# Patient Record
Sex: FEMALE | Race: WHITE | NOT HISPANIC OR LATINO | Employment: FULL TIME | URBAN - METROPOLITAN AREA
[De-identification: names, ages, dates, MRNs, and addresses within clinical notes are randomized per-mention and may not be internally consistent; named-entity substitution may affect disease eponyms.]

---

## 2017-01-12 ENCOUNTER — TELEPHONE (OUTPATIENT)
Dept: PALLIATIVE MEDICINE | Facility: CLINIC | Age: 52
End: 2017-01-12

## 2017-01-12 ENCOUNTER — OFFICE VISIT (OUTPATIENT)
Dept: PALLIATIVE MEDICINE | Facility: CLINIC | Age: 52
End: 2017-01-12
Payer: COMMERCIAL

## 2017-01-12 VITALS
SYSTOLIC BLOOD PRESSURE: 102 MMHG | HEART RATE: 77 BPM | DIASTOLIC BLOOD PRESSURE: 62 MMHG | WEIGHT: 148 LBS | OXYGEN SATURATION: 98 % | BODY MASS INDEX: 28.9 KG/M2

## 2017-01-12 DIAGNOSIS — B02.29 POST HERPETIC NEURALGIA: Primary | ICD-10-CM

## 2017-01-12 PROCEDURE — 99243 OFF/OP CNSLTJ NEW/EST LOW 30: CPT | Performed by: ANESTHESIOLOGY

## 2017-01-12 RX ORDER — LIDOCAINE 50 MG/G
1 PATCH TOPICAL EVERY 24 HOURS
Qty: 30 PATCH | Refills: 1 | Status: SHIPPED | OUTPATIENT
Start: 2017-01-12 | End: 2022-02-23

## 2017-01-12 ASSESSMENT — PAIN SCALES - GENERAL: PAINLEVEL: MILD PAIN (3)

## 2017-01-12 NOTE — PATIENT INSTRUCTIONS
We will call you to schedule Qutenza patch - capsaisin treatment  Prescriptions sent to your pharmacy    Maria Alejandra Card MD     Nurse Triage line:  [phone]   Call this number with any questions or concerns. You may leave a detailed message anytime. Calls are typically returned Monday through Friday between 8 AM and 4:30 PM. We usually get back to you within 2 business days depending on the issue/request.       Medication refills:    For non-narcotic medications, call your pharmacy directly to request a refill. The pharmacy will contact the [facility] for authorization. Please allow 3-4 days for these refills to be processed.     For narcotic refills, call the nurse triage line or send a TiVo message. Please contact us 7-10 days before your refill is due. The message MUST include the name of the specific medication(s) requested and how you would like to receive the prescription(s). The options are as follows:    Pain Clinic staff can mail the prescription to your pharmacy. Please tell us the name of the pharmacy.    You may pick the prescription up at the Pain Clinic (tell us the location) or during a clinic visit with your pain provider    Pain Clinic staff can deliver the prescription to the [facility] pharmacy in the clinic building. Please tell us the location.      Scheduling number: [phone].  Call this number to schedule or change appointments.    We believe regular attendance is key to your success in our program.    Any time you are unable to keep your appointment we ask that you call us at least 24 hours in advance to let us know. This will allow us to offer the appointment time to another patient.

## 2017-01-12 NOTE — MR AVS SNAPSHOT
After Visit Summary   1/12/2017    Tomasa Darling    MRN: [id number]           Patient Information     Date Of Birth          1965        Visit Information        Provider Department      1/12/2017 11:00 AM Maria Alejandra Card MD [facility]        Today's Diagnoses     Post herpetic neuralgia    -  1       Care Instructions    We will call you to schedule Qutenza patch - capsaisin treatment  Prescriptions sent to your pharmacy    Maria Alejandra Card MD     Nurse Triage line:  [phone]   Call this number with any questions or concerns. You may leave a detailed message anytime. Calls are typically returned Monday through Friday between 8 AM and 4:30 PM. We usually get back to you within 2 business days depending on the issue/request.       Medication refills:    For non-narcotic medications, call your pharmacy directly to request a refill. The pharmacy will contact the [facility] for authorization. Please allow 3-4 days for these refills to be processed.     For narcotic refills, call the nurse triage line or send a [employer] message. Please contact us 7-10 days before your refill is due. The message MUST include the name of the specific medication(s) requested and how you would like to receive the prescription(s). The options are as follows:    Pain Clinic staff can mail the prescription to your pharmacy. Please tell us the name of the pharmacy.    You may pick the prescription up at the Pain Clinic (tell us the location) or during a clinic visit with your pain provider    Pain Clinic staff can deliver the prescription to the [facility] pharmacy in the clinic building. Please tell us the location.      Scheduling number: [phone].  Call this number to schedule or change appointments.    We believe regular attendance is key to your success in our program.    Any time you are unable to keep your appointment we ask that you call us at least 24 hours in advance to let us know.  This will allow us to offer the appointment time to another patient.             Follow-ups after your visit        Who to contact     If you have questions or need follow up information about today's clinic visit or your schedule please contact [facility] directly at [phone].  Normal or non-critical lab and imaging results will be communicated to you by [employer], letter or phone within 4 business days after the clinic has received the results. If you do not hear from us within 7 days, please contact the clinic through [employer]ishart or phone. If you have a critical or abnormal lab result, we will notify you by phone as soon as possible.  Submit refill requests through [employer] or call your pharmacy and they will forward the refill request to us. Please allow 3 business days for your refill to be completed.          Additional Information About Your Visit        [employer] Information     [employer] gives you secure access to your electronic health record. If you see a primary care provider, you can also send messages to your care team and make appointments. If you have questions, please call your primary care clinic.  If you do not have a primary care provider, please call [phone] and they will assist you.        Care EveryWhere ID     This is your Care EveryWhere ID. This could be used by other organizations to access your [place] medical records  [id number]        Your Vitals Were     Pulse Pulse Oximetry Last Period Breastfeeding?          77 98% 07/31/2012 No         Blood Pressure from Last 3 Encounters:   01/12/17 102/62   11/25/16 114/72   02/23/16 112/66    Weight from Last 3 Encounters:   01/12/17 67.132 kg (148 lb)   11/25/16 67.495 kg (148 lb 12.8 oz)   02/23/16 69.854 kg (154 lb)              Today, you had the following     No orders found for display         Today's Medication Changes          These changes are accurate as of: 1/12/17 11:55 AM.  If you have any questions, ask your nurse  or doctor.               Start taking these medicines.        Dose/Directions    amitriptyline 25 MG tablet   Commonly known as:  ELAVIL   Used for:  Post herpetic neuralgia        Dose:  25 mg   Take 1 tablet (25 mg) by mouth At Bedtime   Quantity:  90 tablet   Refills:  1       diclofenac sodium 3 % Gel   Used for:  Post herpetic neuralgia        Apply to affected area up to 2 times/day   Quantity:  1 Tube   Refills:  1       lidocaine 2 % topical gel   Commonly known as:  XYLOCAINE   Used for:  Post herpetic neuralgia        Apply topically as needed for moderate pain   Quantity:  30 mL   Refills:  1       lidocaine 5 % Patch   Commonly known as:  LIDODERM   Used for:  Post herpetic neuralgia        Dose:  1 patch   Place 1 patch onto the skin every 24 hours   Quantity:  30 patch   Refills:  1            Where to get your medicines      These medications were sent to [facility] [address] [facility] - [place], [place] - [address]  [facility]  [address]  [facility] [address]     Phone:  [phone]    - amitriptyline 25 MG tablet  - diclofenac sodium 3 % Gel  - lidocaine 2 % topical gel  - lidocaine 5 % Patch             Primary Care Provider Office Phone # Fax #    Krishnakumari G MD Peter [phone]       [facility] [address] [place] [address]        Thank you!     Thank you for choosing [facility]  for your care. Our goal is always to provide you with excellent care. Hearing back from our patients is one way we can continue to improve our services. Please take a few minutes to complete the written survey that you may receive in the mail after your visit with us. Thank you!             Your Updated Medication List - Protect others around you: Learn how to safely use, store and throw away your medicines at www.disposemymeds.org.          This list is accurate as of: 1/12/17 11:55 AM.  Always use your most recent med list.                   Brand Name  Dispense Instructions for use    amitriptyline 25 MG tablet    ELAVIL    90 tablet    Take 1 tablet (25 mg) by mouth At Bedtime       diclofenac sodium 3 % Gel     1 Tube    Apply to affected area up to 2 times/day       ibuprofen 200 MG tablet    ADVIL/MOTRIN    30 tablet    Take 3 tablets (600 mg) by mouth every 8 hours as needed for mild pain       lidocaine 2 % topical gel    XYLOCAINE    30 mL    Apply topically as needed for moderate pain       lidocaine 5 % Patch    LIDODERM    30 patch    Place 1 patch onto the skin every 24 hours       NASONEX 50 MCG/ACT spray   Generic drug:  mometasone     1 Month    INHALE 2 SPRAYS IN EACH NOSTRIL ONE DAILY

## 2017-01-12 NOTE — TELEPHONE ENCOUNTER
Routing to  to verify insurance coverage for Qutenza per provider request.    Per OV with Dr. Card 01/12/17:  1. Further procedures recommended: Qutenza Patch - will order and check insurance prior to scheduling  2. Referrals: none  3. Follow up: with Dr. Card after Qutenza treatment    Yazmin FLORESN-RN Care Coordinator  [facility]

## 2017-01-12 NOTE — PROGRESS NOTES
[facility] Consultation    Date of visit: 1/12/2017    Reason for consultation:    Tomasa Darling is a 51 year old female who is seen in consultation today at the request of her physician, Erica Green.     Consultation and Evaluation for: What is your diagnosis for the patient's pain? Muscle pains, possible fibromyalgia    Do you have any specific questions for the pain specialist? No    Are there any red flags that may impact the assessment or management of the patient? None    Primary Care Provider is Erica Green    Review of [place] Prescription Monitoring Program (): Today I have also reviewed the patient's history of controlled substance use, as provided by [place] licensed pharmacies and prescriber dispensers. YES, no controlled substances in the last year    Opioid and other pain medications are not being prescribed.    Review of Pain Questionnaire: Please see the [facility] health questionnaire, which the patient completed and reviewed with me in detail.    Review of Electronic Chart: Today I have also reviewed available medical information in the patient's medical record at [facility] ([facility]), including relevant provider notes, laboratory work, and imaging.     A dual evaluation (medical and behavioral) was not scheduled today     Tomasa Darling has not been seen at a pain clinic in the past.      Chief Complaint:    Chief Complaint   Patient presents with     Consult       Pain history:  Tomasa Darling is a 51 year old female who presents for initial evaluation of chief pain complaint of burning pain across her upper back.  Pain is in the T5-6 dermatomal distribution on the right side - does cross the midline to the left side.  This pain began about 6 years ago after an episode of shingles.  She did not take an anti viral.   Secondary pain complaints include low back pain, occasional numbness in the bottom of  her feet and on her left face/cheek.     She has a 7000.00 deductible with her medical insurance and paid 3000.00 for PT which was not helpful.  She exercised regularly, works full time as a dental hygeinist, and goes the the chiropractor regularly.  Acupuncture was not helpful. She takes supplements daily and is otherwise completely healthy.  This pain has significantly impacted her life over the last 6 years.  She has found cupping and tiger oil the only things that have been helpful.  Her sleep is impacted and she is only getting 5-6 hours/night.  She is not interested in medications other than what can be purchased over the counter.      Location: upper back dermatome T5/6  Quality: burning, throbbing  Severity/Intensity: 10/10 at worst, 3/10 at best, 6/10 on average  Aggravating factors include: sleeping on her Left side  Relieving factors include: advil and working out  Red Flags: The patient denies bowel or bladder incontinence, parasthesias, weakness, saddle anesthesia, unintentional weight loss, or fever/chills/sweats.     Pain Treatments:  1. Medications:       Current pain medications:   advil PM   Tylenol PM   Tiger oil         Previous pain medications:    2. Physical Therapy: Yes, full course not helpful  TENS unit: yes, not helpful  3. Pain psychology: never  4. Surgery: denies  5. Injections: denies  6. Alternative Therapies: Chiropractic: YES  Acupuncture: YES, not helpful      Diagnostic tests:  XR CERVICAL SPINE 2/3 VWS  1/22/2016 1:37 PM     HISTORY:  whiplash     COMPARISON:  None.         IMPRESSION:  Exam within normal limits.     Past Medical History:  Past Medical History   Diagnosis Date     Chronic rhinitis      Hypercholesteremia        Past Surgical History:  Past Surgical History   Procedure Laterality Date     Appendectomy         Medications:  Current Outpatient Prescriptions   Medication Sig Dispense Refill     ibuprofen (ADVIL,MOTRIN) 200 MG tablet Take 3 tablets (600 mg) by mouth  every 8 hours as needed for mild pain 30 tablet 0     NASONEX 50 MCG/ACT NA SUSP INHALE 2 SPRAYS IN EACH NOSTRIL ONE DAILY 1 Month 0-MD visit needed       Allergies:     Allergies   Allergen Reactions     Clindamycin        Social History:  Home situation: Lives with her mother in low (90) and her son who is 25  Occupation/Schooling: college  Tobacco use: 20 years, 1/2 ppd, quit   Drug use: never  Alcohol use: never  History of chemical dependency treatment: denies  Mental health admissions: NO    Family history:  Family History   Problem Relation Age of Onset     CANCER Brother      bone and lung cancer     HEART DISEASE Father      4 heart attacks     C.A.D. Father      rhematic fever as a child;  of MI     Musculoskeletal Disorder Father      gout     Family History Negative Mother      C.A.D. Mother      aortic valve issue     CEREBROVASCULAR DISEASE Mother      Unknown/Adopted Brother      suicide     Psychotic Disorder Brother      depression       Review of Systems:    POSTIVE IN BOLD  GENERAL: fever/chills, fatigue, general unwell feeling, weight gain/loss.  HEAD/EYES:  headache, dizziness, or vision changes.    EARS/NOSE/THROAT:  Nosebleeds, hearing loss, sinus infection, earache, tinnitus.  IMMUNE:  Allergies, cancer, immune deficiency, or infections.  SKIN:  Urticaria, rash, hives  HEME/Lymphatic:   anemia, easy bruising, easy bleeding.  RESPIRATORY:  cough, wheezing, or shortness of breath  CARDIOVASCULAR/Circulation:  Extremity edema, syncope, hypertension, tachycardia, or angina.  GASTROINTESTINAL:  abdominal pain, nausea/emesis, diarrhea, constipation,  hematochezia, or melena.  ENDOCRINE:  Diabetes, steroid use,  thyroid disease or osteoporosis.  MUSCULOSKELETAL: neck pain, back pain, arthralgia, arthritis, or gout.  GENITOURINARY:  frequency, urgency, dysuria, difficulty voiding, hematuria or incontinence.  NEUROLOGIC:  weakness, numbness, paresthesias, seizure, tremor, stroke or memory  loss.  PSYCHIATRIC:  depression, anxiety, stress, suicidal thoughts or mood swings.     Physical Exam:  Filed Vitals:    01/12/17 1059   BP: 102/62   Pulse: 77   Weight: 67.132 kg (148 lb)   SpO2: 98%     Exam:  Constitutional: Well developed, well nourished, appears stated age.  HEENT: Head atraumatic, normocephalic. Eyes without conjunctival injection or jaundice. Oropharynx clear. Neck supple. No obvious neck masses.  Cardiovascular: Regular rate/rhythm; no murmurs/rubs/gallops appreciated.  Respiratory: Lungs clear to auscultation bilaterally, no wheezing/rales/rhonchi.   Skin: No rash, lesions, or petechiae of exposed skin.   Extremities:  No clubbing, cyanosis, or edema.  Psychiatric/mental status: Alert, without lethargy or stupor. Speech fluent. Appropriate affect. Mood normal. Able to follow commands without difficulty.     Musculoskeletal exam:  Gait/Station/Posture:   Normal stance, arm swing, and stride; no antalgia or Trendelenburg  Normal bulk and tone. Unremarkable spinal curvature. ASIS heights even.     Cervical spine:  Range of motion within normal limits     Neurologic exam:  CN:  Cranial nerves 2-12 are normal  Motor:  5/5 UE and LE strength  Sensory:  (upper and lower extremities):   Light touch: normal    Vibration: normal    Allodynia: present on the right in the T6 dermatomal distribution       DIRE Score for ongoing opioid management is calculated as follows:   Diagnosis = 2 pts (slowly progressive; moderate pain/objective findings)   Intractability = 2 pts (most treatments tried; patient not fully engaged/barriers)   Risk    Psych = 3 pts (no significant personality dysfunction/mental illness; good communication with clinic)    Chem Hlth = 3 pts (no history of chemical dependency; not drug-focused)   Reliability = 3 pts (highly reliable with meds, appointments, treatments)   Social = 3 pts (supportive family/close relationships; involved in work/school; no isolation)   (Psych + Chem hlth +  Reliability + Social) = 12     Efficacy = 2 pts (moderate benefit/function; low med dose; too early/not tried meds)         DIRE Score = 18        7-13: likely NOT suitable candidate for long-term opioid analgesia       14-21: may be a suitable candidate for long-term opioid analgesia        Assessment:  Tomasa Darling is a 51 year old female with a past medical history significant for chronic rhinitis and a history of shingles who presents with complaints of burning pain in the T5,T6 dermatomal distribution on the right.     1. Post - herpetic Neuralgia  2. Mental Health - the patient does not have significant mental health concerns that would affect her experience of pain and contribute to her clinically significant distress.      Plan:  The following recommendations were given to the patient. Diagnosis, treatment options, risks, benefits, and alternatives were discussed, and all questions were answered. The patient expressed understanding of the plan for management.     I am recommending a multidisciplinary treatment plan to help this patient better manage her pain.  This includes:     1. Physical Therapy: Tried and did not help  2. Clinical Health Psychologist to address issues of relaxation, behavioral change, coping styles, and other factors important to improvement: Not indicated  3. Self Care Instructions/Recommendations: Continue daily exercise   4. Diagnostic Studies: none  5. Medication Management:   1. Lidocaine patches, ointment  2. Diclofenac gel  3. Nortriptyline 25mg qhs - will consider increasing at next visit.  She is hesitant to take any medications.    4. Could consider gabapentin in the future  6. Further procedures recommended: Qutenza Patch - will order and check insurance prior to scheduling  7. Referrals: none  8. Follow up: with Dr. Card after Qutenza treatment      Total time spent was 45 minutes. Greater than 50% of face-to-face time was spent in patient counseling and/or coordination  of care.      Maria Alejandra CardMD Pain Management 1/12/2017

## 2017-01-13 ENCOUNTER — TELEPHONE (OUTPATIENT)
Dept: PALLIATIVE MEDICINE | Facility: CLINIC | Age: 52
End: 2017-01-13

## 2017-01-13 ASSESSMENT — PATIENT HEALTH QUESTIONNAIRE - PHQ9: SUM OF ALL RESPONSES TO PHQ QUESTIONS 1-9: 14

## 2017-01-13 NOTE — TELEPHONE ENCOUNTER
Pharmacy faxing a request for a PA on the Diclofenac gel to be sent to [facility] . ID # [id number].  Plan #  1 [phone].  Will route to MA team.  Sari noonan rn

## 2017-01-16 NOTE — TELEPHONE ENCOUNTER
PA pending additional information - how many patches will be needed, routing to determine.        Gin LICEA    [facility]

## 2017-01-17 NOTE — TELEPHONE ENCOUNTER
Faxed completed PA form and supporting documentation for Qutenza to [facility]. Right fax confirmation 1/17 at 8:45 am.    Gin LICEA    [facility]

## 2017-01-18 NOTE — TELEPHONE ENCOUNTER
Faxed completed PA form and supporting documentation for Qutenza to [facility]- Per [facility].  Right fax confirmation 1/18 at 3:18 pm.          Gin LICEA    [facility]

## 2017-01-19 NOTE — TELEPHONE ENCOUNTER
I submitted a prior authorization to the patients insurance via [employer] Med's. I am waiting to hear back from the insurance company.      Zarina Sanchez [facility] [place]

## 2017-01-23 NOTE — TELEPHONE ENCOUNTER
Call came in today at 8:36 am .  [facility] pharmacy calling to check on the status of the PA ? Please call .   Sari noonan rn

## 2017-01-23 NOTE — TELEPHONE ENCOUNTER
Received faxed Prior Auth Request form from [employer] dated 1/18/17. The letter states that additional information is needed and that the attached form needed to be completed and returned by 1/21/17. If the form was not completed by that date, the medication would be denied and an appeal would be required.  The preferred alternative is gabapentin.  [facility] would like to know if that patient has tried gabapentin and, if not, the reason why the patient could not try this medication.     Number to call for questions or to complete this by phone is: [phone]; case #[id number]. Fax had been put in Dr. Card's mail box and since she was out of the office last week, staff did not receive this information until today. Form in bin in nurse office. Will scan or complete once response received from Dr. Card.   --------  Reviewed chart and see the following plan from pt's 1/12 visit:    Plan:  The following recommendations were given to the patient. Diagnosis, treatment options, risks, benefits, and alternatives were discussed, and all questions were answered. The patient expressed understanding of the plan for management.     I am recommending a multidisciplinary treatment plan to help this patient better manage her pain.  This includes:        1. Physical Therapy: Tried and did not help  2. Clinical Health Psychologist to address issues of relaxation, behavioral change, coping styles, and other factors important to improvement: Not indicated  3. Self Care Instructions/Recommendations: Continue daily exercise    4. Diagnostic Studies: none  5. Medication Management:    1. Lidocaine patches, ointment  2. Diclofenac gel  3. Nortriptyline 25mg qhs - will consider increasing at next visit.  She is hesitant to take any medications.     4. Could consider gabapentin in the future  6. Further procedures recommended: Qutenza Patch - will order and check insurance prior to scheduling  7. Referrals: none  8. Follow up:  with Dr. Card after Qutenza treatment  -------------------  Unless rationale is provided for the contraindication for use of gabapentin, pt will likely need trial that medication before approval will be given for Qutenza. Will have Dr. Card review.      MARK MaganaN, RN-BC  Patient Care Supervisor/Care Coordinator  [facility]

## 2017-01-23 NOTE — TELEPHONE ENCOUNTER
The patient does not want to try gabapentin as she is fundamentally opposed to all prescription medications.  She has side effects from systemic medications and does not want to take gabapentin.  She only takes over the counter medications. She has a history of side effects like sedation from prescription medications.      Maria Alejandra Card MD

## 2017-01-24 NOTE — TELEPHONE ENCOUNTER
PA form completed with the information provided by Dr. Card. Will obtain her signature and fax to [facility].     GRACE Magana, RN-BC  Patient Care Supervisor/Care Coordinator  [facility]

## 2017-01-27 NOTE — TELEPHONE ENCOUNTER
Received PA denial for the Diclofenac gel 3% from [facility], reason for the denial pt does not have the right diagnosis. Patient must try and fail  Efudex 5% cream, and Carac 5% cream first    Meaghan Neff MA  [facility] Center

## 2017-01-27 NOTE — TELEPHONE ENCOUNTER
PA denied.  Reason given:      Patient does not meet PA criteria. Per [employer], patient has not tried two formulary alternatives and there is no documentation as to why the patient can not try this.  Patient notified:  Yes   Patient informed directly/PT. informed of right to appeal. Called patient, no answer. Will send [employer] message.          Gin LICEA    [facility]

## 2017-01-31 NOTE — TELEPHONE ENCOUNTER
I spoke with Tomasa to see what her thoughts were regarding the recent denial of the Qutenza treatment. She does not want to pursue the alternative treatments at this time but is considering paying cash for the Qutenza treatment. However she is concerned about the side effects of Qutenza and is unsure if she will pursue it all all. She has an upcoming appointment with Dr. Card on 2/2/2017. She plans on discussing options at that time. Routing to Dr. Cheung as an FYI. Closing encounter.    MARK GodinezN, RN  Care Coordinator  [facility]

## 2017-01-31 NOTE — TELEPHONE ENCOUNTER
I spoke to Tomasa and she has an appointment with Dr. Card on 2/2/17 to discuss alternative medications as this med is denied. She is unsure if she wants to consider the alternatives at this time. She would rather discuss it with Dr. Card at her office visit.     MARK GodinezN, RN  Care Coordinator  [facility]

## 2017-01-31 NOTE — TELEPHONE ENCOUNTER
This was routed to Dr. Card to review denial and for consideration of alternatives.     MARK GodinezN, RN  Care Coordinator  [facility]

## 2017-02-02 ENCOUNTER — OFFICE VISIT (OUTPATIENT)
Dept: PALLIATIVE MEDICINE | Facility: CLINIC | Age: 52
End: 2017-02-02
Payer: COMMERCIAL

## 2017-02-02 VITALS
DIASTOLIC BLOOD PRESSURE: 72 MMHG | WEIGHT: 148 LBS | OXYGEN SATURATION: 98 % | SYSTOLIC BLOOD PRESSURE: 94 MMHG | HEART RATE: 62 BPM | BODY MASS INDEX: 28.9 KG/M2

## 2017-02-02 DIAGNOSIS — M75.22 BICIPITAL TENDINITIS OF LEFT SHOULDER: ICD-10-CM

## 2017-02-02 DIAGNOSIS — B02.29 POST HERPETIC NEURALGIA: Primary | ICD-10-CM

## 2017-02-02 PROCEDURE — 99213 OFFICE O/P EST LOW 20 MIN: CPT | Performed by: ANESTHESIOLOGY

## 2017-02-02 RX ORDER — GABAPENTIN 100 MG/1
100 CAPSULE ORAL AT BEDTIME
Qty: 30 CAPSULE | Refills: 3 | Status: SHIPPED | OUTPATIENT
Start: 2017-02-02 | End: 2022-02-23

## 2017-02-02 ASSESSMENT — PAIN SCALES - GENERAL: PAINLEVEL: SEVERE PAIN (7)

## 2017-02-02 NOTE — PROGRESS NOTES
[facility]    Date of visit: 2/2/2017    Chief complaint:   Chief Complaint   Patient presents with     Pain       Interval history:  Tomasa Darling is a 51 year old female last seen by me on 1/12/2017.      Recommendations/plan at the last visit included:       1. Physical Therapy: Tried and did not help  2. Clinical Health Psychologist to address issues of relaxation, behavioral change, coping styles, and other factors important to improvement: Not indicated  3. Self Care Instructions/Recommendations: Continue daily exercise    4. Diagnostic Studies: none  5. Medication Management:    1. Lidocaine patches, ointment  2. Diclofenac gel  3. Nortriptyline 25mg qhs - will consider increasing at next visit.  She is hesitant to take any medications.     4. Could consider gabapentin in the future  6. Further procedures recommended: Qutenza Patch - will order and check insurance prior to scheduling  7. Referrals: none  8. Follow up: with Dr. Card after Qutenza treatment    Since her last visit, Tomasa Darling reports:  - her back pain (burning shooting) continues and she would like to proceed with the qutenza patch  - she works as a dental assistant full time and has noticed Left shoulder pain when reaching for tools. Some pain on the Right as well  - She has researched fibromyalgia and wonders if she has this  - insurance is frustrating as is her 7000.00 deductible. She recently switched insurance plans to [employer] and they dont cover anything.    - She tried Nortriptyline at bedtime and it caused sedation well into the next day making it impossible to drive or work.     Pain scores:  Pain intensity on average is 7 on a scale of 0-10.     Current pain treatments:   Tiger Oil  Cupping  Nortriptyline 25mg qhs    Past pain treatments:  Lidocaine patches    Side Effects: sedation    Medications:  Current Outpatient Prescriptions   Medication Sig Dispense Refill     diclofenac  sodium 3 % GEL Apply to affected area up to 2 times/day 1 Tube 1     lidocaine (XYLOCAINE) 2 % topical gel Apply topically as needed for moderate pain 30 mL 1     amitriptyline (ELAVIL) 25 MG tablet Take 1 tablet (25 mg) by mouth At Bedtime 90 tablet 1     lidocaine (LIDODERM) 5 % Patch Place 1 patch onto the skin every 24 hours 30 patch 1     ibuprofen (ADVIL,MOTRIN) 200 MG tablet Take 3 tablets (600 mg) by mouth every 8 hours as needed for mild pain 30 tablet 0     NASONEX 50 MCG/ACT NA SUSP INHALE 2 SPRAYS IN EACH NOSTRIL ONE DAILY 1 Month 0-MD visit needed       Medical History: any changes in medical history since they were last seen? No    Review of Systems:  The 14 system ROS was reviewed from the intake questionnaire, and is positive for: shoulder pain and back pain  Any bowel or bladder problems: denies  Mood: good    Physical Exam:  Blood pressure 94/72, pulse 62, weight 67.132 kg (148 lb), last menstrual period 07/31/2012, SpO2 98 %, not currently breastfeeding.  General: awake, alert, appropriate affect, NAD  Gait: Normal  MSK exam: bilateral UE full ROM.  TTP over bicipital groove on the left.  Negative ballard and neers    Assessment:   Tomasa Darling is a 51 year old female with a past medical history significant for chronic rhinitis and a history of shingles who presents with complaints of burning pain in the T5,T6 dermatomal distribution on the right.     1. Post - herpetic Neuralgia  2. Left Bicipital tendinitis - 1 month      Plan:  1. Physical Therapy:  Done recently and cost 3000.00 and was not helpful. She does continue to go to the chiropractor, get acupuncture and do regular massage.   2. Clinical Health Psychologist to address issues of relaxation, behavioral change, coping style, and other factors important to improvement.  NOT INDICATED  3. Diagnostic Studies:  NONE  4. Medication Management:     Nortiptylene 25mg qhs - sedating and discontinued   Gabapentin 100mg qhs  5. Further  procedures recommended: Qutenza Patch  6. Follow up: for qutenza patch after insurance approval    Total time spent was 30 minutes, and more than 50% of face to face time was spent in counseling and/or coordination of care.      [facility]  2/2/2017

## 2017-02-02 NOTE — PATIENT INSTRUCTIONS
Nurse Triage line:  [phone]   Call this number with any questions or concerns. You may leave a detailed message anytime. Calls are typically returned Monday through Friday between 8 AM and 4:30 PM. We usually get back to you within 2 business days depending on the issue/request.       Medication refills:    For non-narcotic medications, call your pharmacy directly to request a refill. The pharmacy will contact the [facility] Center for authorization. Please allow 3-4 days for these refills to be processed.     For narcotic refills, call the nurse triage line or send a [employer] message. Please contact us 7-10 days before your refill is due. The message MUST include the name of the specific medication(s) requested and how you would like to receive the prescription(s). The options are as follows:    Pain Clinic staff can mail the prescription to your pharmacy. Please tell us the name of the pharmacy.    You may pick the prescription up at the Pain Clinic (tell us the location) or during a clinic visit with your pain provider    Pain Clinic staff can deliver the prescription to the [facility] pharmacy in the clinic building. Please tell us the location.      Scheduling number: [phone].  Call this number to schedule or change appointments.    We believe regular attendance is key to your success in our program.    Any time you are unable to keep your appointment we ask that you call us at least 24 hours in advance to let us know. This will allow us to offer the appointment time to another patient.

## 2017-02-24 ENCOUNTER — TELEPHONE (OUTPATIENT)
Dept: OTHER | Facility: CLINIC | Age: 52
End: 2017-02-24

## 2017-02-24 NOTE — TELEPHONE ENCOUNTER
2/24/2017    Call Regarding Onboarding are choices     Attempt 1    Message on voicemail     Comments: declined      Outreach   cnt

## 2017-03-08 DIAGNOSIS — B02.29 POST HERPETIC NEURALGIA: Primary | ICD-10-CM

## 2017-03-08 NOTE — TELEPHONE ENCOUNTER
Call came in at 11:14 am today.    Dr. Card is trying to get Qutenza authorized. She gave me gabapentin and I can't take it . It makes me too weird and is not helpful.  Wondering if she can proceed with the Qutenza PA now that I can't take the gabapentin?  Sari noonan rn

## 2017-03-08 NOTE — LETTER
[facility]  [address]  [address]  [facility] [address]  [phone]      2017    DARRIAN   [address]  [place]  [address]  ATTN: L4N  Phone # [phone]      Dear Darrian,    I am writing this letter on behalf of Tomasa Darling,  1965.  I am her treating physician at the [facility].  I am writing for approval of the Qutenza patch.    Tomasa Darling has Post Herpetic Neuralgia in the T5 and T6 dermatomal distribution on her Right side.       She has been participating in conservative care, including physical therapy and has tried multiple different oral medications including Nortriptylene, Gabapentin and the topicals lidocaine and diclofenac gel.  None of these have been beneficial in treating her pain and she has had significant side effects from the oral medicaitons including sedation and nausea.    I am writing to ask for special consideration, to approval the Qutenza Patch.     Maria Alejandra Card MD   [facility]

## 2017-03-08 NOTE — LETTER
April 27, 2017   [facility]      [address]      [place] [address]      [phone]    Tomasa Darling  [address]  [facility] [address]    Dear Tomasa,    You have been scheduled for a Qutenza procedure on Thursday May 4th at 8:00 am with Dr. Card.  You should plan to be at the clinic for about 3 hours and will need someone with you to drive you home after the procedure. Please wear comfortable clothing to the appointment.      A prescription for prilocaine/lidocaine 5% cream will sent to your local pharmacy.  Please  the prescription and bring it to your appointment.       The process for the Qutenza procedure is as follows:      Dr. Card will identify the area to be treated with a skin marker.  If needed, any hair in the area will be clipped, not shaved, to help the patch stick better.    The lidocaine cream will be applied to the area and that will remain on your skin for up to 60 minutes.    The lidocaine cream will be wiped off gently with a dry wipe and then the area will be gently washed with mild soap and water.     When the area is completely dry, the nurse will carefully apply the Qutenza patch(es) to the identified area.  The patch will remain in place for 60 minutes.      During and after the procedure you may experience:    Localized pain or burning sensation    Skin redness or irritation    Itching    Small blisters in the area of the treatment    Your blood pressure will be monitored during the procedure    After 60 minutes, a nurse will remove the patch gently and slowly    Then, the nurse will apply a cleansing gel to the treatment area.    After about a minute, the cleansing gel will be removed and the area will be gently washed with mild soap and water.     When your condition is stable and instructions have been given, you will be discharged.  This may take 15 to 30 minutes.    Bringing along some music to listen to or a good book  to read may be helpful in distracting you from any pain you may experience during the procedure.     Please notify the scheduling department 48 hours in advance if you are not able to keep this appointment.  You may call [phone] with any questions regarding your appointment or the Qutenza procedure.      Sincerely,   MARK MaganaN, RN-BC

## 2017-03-13 NOTE — TELEPHONE ENCOUNTER
Faxed completed PA form and supporting documentation for QUTENZA to [facility]. RIGHT FAX CONFIRMATION 3/13 AT 10:58 AM.          Gin LICEA    [facility]

## 2017-03-14 NOTE — TELEPHONE ENCOUNTER
Incoming fax from TEODORA Colmenares needs to be sent to [facility] [employer]. Right fax confirmation 3/14 at 1:28 pm, PA faxed to [facility].      Gin LICEA    [facility]

## 2017-03-15 NOTE — TELEPHONE ENCOUNTER
PA denied.  Reason given:      No reason given for denial  Patient notified:  Left VM for patient to call back and discuss.      If appeal wants to be done, it needs to be sent to:    [facility]  [address]  [place] [address]  Attention: L4N  # [phone]      Routing to Dr. Card to review.        Gin LICEA    [facility]

## 2017-03-17 ENCOUNTER — TELEPHONE (OUTPATIENT)
Dept: CASE MANAGEMENT | Facility: CLINIC | Age: 52
End: 2017-03-17

## 2017-03-20 NOTE — TELEPHONE ENCOUNTER
Routing to Gin as she is working on the appeal.    MARK GodinezN, RN  Care Coordinator  [facility]

## 2017-03-20 NOTE — TELEPHONE ENCOUNTER
"I have written an appeal letter to be sent to [facility].  It is saved under \"letters\" in this encounter.     Thanks,     Maria Alejandra Card MD   "

## 2017-03-22 NOTE — TELEPHONE ENCOUNTER
Appeal letter faxed to [facility] at [phone], right fax confirmation 3/22 at 11:11 am.        Gin LICEA    [facility]

## 2017-04-12 NOTE — TELEPHONE ENCOUNTER
Routing to Gin Burgess - is there an update available on this appeal letter? Patient is being seen tomorrow.    Thank you,    Mary Lopez, MSN, RN-BC  Care Coordinator  [facility]

## 2017-04-12 NOTE — TELEPHONE ENCOUNTER
Called [facility] for an update, was informed that I was given an incorrect fax number. Phyllis gave me the correct fax number to re-fax the appeal to [facility].      Gin LICEA    [facility]

## 2017-04-13 ENCOUNTER — OFFICE VISIT (OUTPATIENT)
Dept: PALLIATIVE MEDICINE | Facility: CLINIC | Age: 52
End: 2017-04-13
Payer: COMMERCIAL

## 2017-04-13 VITALS
BODY MASS INDEX: 29.1 KG/M2 | SYSTOLIC BLOOD PRESSURE: 115 MMHG | DIASTOLIC BLOOD PRESSURE: 77 MMHG | WEIGHT: 149 LBS | OXYGEN SATURATION: 98 % | HEART RATE: 66 BPM

## 2017-04-13 DIAGNOSIS — M75.42 IMPINGEMENT SYNDROME OF LEFT SHOULDER: Primary | ICD-10-CM

## 2017-04-13 PROCEDURE — 20610 DRAIN/INJ JOINT/BURSA W/O US: CPT | Mod: LT | Performed by: ANESTHESIOLOGY

## 2017-04-13 ASSESSMENT — PAIN SCALES - GENERAL: PAINLEVEL: MODERATE PAIN (5)

## 2017-04-13 NOTE — PROGRESS NOTES
[facility]    Date of visit: 4/13/2017    Chief complaint:   Chief Complaint   Patient presents with     Pain     Follow up       Interval history:  Tomasa Darling is a 51 year old female last seen by me on 2/2/2017.      Recommendations/plan at the last visit included:       Plan:  1. Physical Therapy:  Done recently and cost 3000.00 and was not helpful. She does continue to go to the chiropractor, get acupuncture and do regular massage.   2. Clinical Health Psychologist to address issues of relaxation, behavioral change, coping style, and other factors important to improvement.  NOT INDICATED  3. Diagnostic Studies:  NONE  4. Medication Management:     Nortiptylene 25mg qhs - sedating and discontinued   Gabapentin 100mg qhs  5. Further procedures recommended: Qutenza Patch  6. Follow up: for qutenza patch after insurance approval      Since her last visit, Tomasa Darling reports:  - her back pain (burning shooting) continues and she would like to proceed with the qutenza patch  - Left shoulder pain when reaching up has worsened.  She has noticed decreased ROM as well.  - insurance is frustrating as is her 7000.00 deductible. She recently switched insurance plans to [employer] and they dont cover anything.    - She tried Nortriptyline at bedtime and it caused sedation well into the next day making it impossible to drive or work.     Pain scores:  Pain intensity on average is 4-5 on a scale of 0-10.     Current pain treatments:   Tiger Oil  Cupping  Nortriptyline 25mg qhs    Past pain treatments:  Lidocaine patches    Side Effects: sedation    Medications:  Current Outpatient Prescriptions   Medication Sig Dispense Refill     gabapentin (NEURONTIN) 100 MG capsule Take 1 capsule (100 mg) by mouth At Bedtime 30 capsule 3     diclofenac sodium 3 % GEL Apply to affected area up to 2 times/day 1 Tube 1     lidocaine (XYLOCAINE) 2 % topical gel Apply topically as needed for  moderate pain 30 mL 1     amitriptyline (ELAVIL) 25 MG tablet Take 1 tablet (25 mg) by mouth At Bedtime 90 tablet 1     lidocaine (LIDODERM) 5 % Patch Place 1 patch onto the skin every 24 hours 30 patch 1     ibuprofen (ADVIL,MOTRIN) 200 MG tablet Take 3 tablets (600 mg) by mouth every 8 hours as needed for mild pain 30 tablet 0     NASONEX 50 MCG/ACT NA SUSP INHALE 2 SPRAYS IN EACH NOSTRIL ONE DAILY 1 Month 0-MD visit needed       Medical History: any changes in medical history since they were last seen? No    Review of Systems:  The 14 system ROS was reviewed from the intake questionnaire, and is positive for: shoulder pain and back pain  Any bowel or bladder problems: denies  Mood: good    Physical Exam:  Blood pressure 115/77, pulse 66, weight 67.6 kg (149 lb), last menstrual period 07/31/2012, SpO2 98 %, not currently breastfeeding.  General: awake, alert, appropriate affect, NAD  Gait: Normal  MSK exam: bilateral UE full ROM.  TTP over bicipital groove on the left.  Negative ballard and neers    Assessment:   Tomasa Darling is a 51 year old female with a past medical history significant for chronic rhinitis and a history of shingles who presents with complaints of burning pain in the T5,T6 dermatomal distribution on the right.     1. Post - herpetic Neuralgia  2. Left Bicipital tendinitis - 1 month      Plan:  7. Physical Therapy:  Done recently and cost 3000.00 and was not helpful. She does continue to go to the chiropractor, get acupuncture and do regular massage.   8. Clinical Health Psychologist to address issues of relaxation, behavioral change, coping style, and other factors important to improvement.  Not indicated  9. Diagnostic Studies:  None, could consider shoulder MRI and sports med referral if no improvement after injection today.  10. Medication Management:     Nortiptylene 25mg qhs - sedating and discontinued   Gabapentin 100mg qhs  11. Further procedures recommended: Qutenza Patch, Left  subacromial joint injection TODAY  12. Follow up: for qutenza patch after insurance approval. Appeal letter faxed to [facility]    Total time spent was 30 minutes, and more than 50% of face to face time was spent in counseling and/or coordination of care.      [facility] - Procedure Note    Left Subacromial Bursa Corticosteroid Injection    Date of Service:  4/13/2017  Procedure performed: Left Subacromial shoulder Injection  Diagnosis:  Impingement syndrome/Rotator cuff injury  : Maria Alejandra Card MD   Anesthesia: none    Indications:   Tomasa Darling is a 51 year old female was sent by myself for a subacromial shoulder injection.  The patient describes Left shoulder pain worse with abduction and extension and decreased ROM. Exam shows decreased active and passive ROM of her left arm.  The patient reports minimal improvement with conservative treatment, including medications and PT.    Imaging: none      Allergies:   Allergies   Allergen Reactions     Clindamycin         Vitals:  Vitals:    04/13/17 1611   BP: 115/77   Pulse: 66   SpO2: 98%   Weight: 67.6 kg (149 lb)       Procedure:  Benefits, risks, and alternatives of the procedure were discussed with the patient, including bleeding, infection, hyperglycemia, localized lipodystrophy and hypopigmentation.  Patient elected to proceed and informed consent was signed.    Area was prepped with ChloraPrep.  Using standard precautions, a 25-gauge 1.5-inch needle was used to inject 4 ml of 0.5% bupivacaine and 2 ml of kenalog (80 mg) into the left subacromial space via a posterolateral approach.  Patient tolerated the procedure well and there were no complications.     Pre-procedure pain score: 5/10  Post-procedure pain score: 5/10    Following today's procedure, the patient was advised to contact the [facility] for any of the following:   Fever, chills, or night sweats   New onset of pain, numbness, or weakness   Any  questions/concerns regarding the procedure    Follow-up includes:   -f/u with myself     [facility]

## 2017-04-13 NOTE — NURSING NOTE
Chief Complaint   Patient presents with     Pain     Follow up       Initial /77  Pulse 66  Wt 67.6 kg (149 lb)  LMP 07/31/2012  SpO2 98%  BMI 29.1 kg/m2 Estimated body mass index is 29.1 kg/(m^2) as calculated from the following:    Height as of 11/25/16: 1.524 m (5').    Weight as of this encounter: 67.6 kg (149 lb).  Medication Reconciliation: floresita Sanchez [facility]

## 2017-04-13 NOTE — PATIENT INSTRUCTIONS
1. You had a left subacromial shoulder injection today with steroid (anti-inflammatory) medications.   2. It is important to stretch and move that arm.     Maria Alejandra Card MD     ----------------------------------------------------------------  Nurse Triage line:  [phone]   Call this number with any questions or concerns. You may leave a detailed message anytime. Calls are typically returned Monday through Friday between 8 AM and 4:30 PM. We usually get back to you within 2 business days depending on the issue/request.       Medication refills:    For non-narcotic medications, call your pharmacy directly to request a refill. The pharmacy will contact the [facility] Center for authorization. Please allow 3-4 days for these refills to be processed.     For narcotic refills, call the nurse triage line or send a [employer] message. Please contact us 7-10 days before your refill is due. The message MUST include the name of the specific medication(s) requested and how you would like to receive the prescription(s). The options are as follows:    Pain Clinic staff can mail the prescription to your pharmacy. Please tell us the name of the pharmacy.    You may pick the prescription up at the Pain Clinic (tell us the location) or during a clinic visit with your pain provider    Pain Clinic staff can deliver the prescription to the [facility] pharmacy in the clinic building. Please tell us the location.      Scheduling number: [phone].  Call this number to schedule or change appointments.    We believe regular attendance is key to your success in our program.    Any time you are unable to keep your appointment we ask that you call us at least 24 hours in advance to let us know. This will allow us to offer the appointment time to another patient.

## 2017-04-13 NOTE — MR AVS SNAPSHOT
After Visit Summary   4/13/2017    Tomasa Darling    MRN: [id number]           Patient Information     Date Of Birth          1965        Visit Information        Provider Department      4/13/2017 4:00 PM Maria Alejandra Card MD [facility]        Care Instructions    1. You had a left subacromial shoulder injection today with steroid (anti-inflammatory) medications.   2. It is important to stretch and move that arm.     Maria Alejandra Card MD     ----------------------------------------------------------------  Nurse Triage line:  [phone]   Call this number with any questions or concerns. You may leave a detailed message anytime. Calls are typically returned Monday through Friday between 8 AM and 4:30 PM. We usually get back to you within 2 business days depending on the issue/request.       Medication refills:    For non-narcotic medications, call your pharmacy directly to request a refill. The pharmacy will contact the [facility] Management Center for authorization. Please allow 3-4 days for these refills to be processed.     For narcotic refills, call the nurse triage line or send a [employer] MatchMaker message. Please contact us 7-10 days before your refill is due. The message MUST include the name of the specific medication(s) requested and how you would like to receive the prescription(s). The options are as follows:    Pain Clinic staff can mail the prescription to your pharmacy. Please tell us the name of the pharmacy.    You may pick the prescription up at the Pain Clinic (tell us the location) or during a clinic visit with your pain provider    Pain Clinic staff can deliver the prescription to the [facility] pharmacy in the clinic building. Please tell us the location.      Scheduling number: [phone].  Call this number to schedule or change appointments.    We believe regular attendance is key to your success in our program.    Any time you are unable to keep your appointment we ask that  you call us at least 24 hours in advance to let us know. This will allow us to offer the appointment time to another patient.             Follow-ups after your visit        Who to contact     If you have questions or need follow up information about today's clinic visit or your schedule please contact [facility] directly at [phone].  Normal or non-critical lab and imaging results will be communicated to you by MyChart, letter or phone within 4 business days after the clinic has received the results. If you do not hear from us within 7 days, please contact the clinic through Grid2020hart or phone. If you have a critical or abnormal lab result, we will notify you by phone as soon as possible.  Submit refill requests through Vasopharm or call your pharmacy and they will forward the refill request to us. Please allow 3 business days for your refill to be completed.          Additional Information About Your Visit        MyChart Information     Vas[employer] gives you secure access to your electronic health record. If you see a primary care provider, you can also send messages to your care team and make appointments. If you have questions, please call your primary care clinic.  If you do not have a primary care provider, please call [phone] and they will assist you.        Care EveryWhere ID     This is your Care EveryWhere ID. This could be used by other organizations to access your [facility] medical records  [id number]        Your Vitals Were     Pulse Last Period Pulse Oximetry BMI (Body Mass Index)          66 07/31/2012 98% 29.1 kg/m2         Blood Pressure from Last 3 Encounters:   04/13/17 115/77   02/02/17 94/72   01/12/17 102/62    Weight from Last 3 Encounters:   04/13/17 67.6 kg (149 lb)   02/02/17 67.1 kg (148 lb)   01/12/17 67.1 kg (148 lb)              Today, you had the following     No orders found for display       Primary Care Provider Office Phone # Fax #    Erica Green MD  [phone]       [facility] [address]  [facility] [address]        Thank you!     Thank you for choosing [facility]  for your care. Our goal is always to provide you with excellent care. Hearing back from our patients is one way we can continue to improve our services. Please take a few minutes to complete the written survey that you may receive in the mail after your visit with us. Thank you!             Your Updated Medication List - Protect others around you: Learn how to safely use, store and throw away your medicines at www.disposemymeds.org.          This list is accurate as of: 4/13/17  4:30 PM.  Always use your most recent med list.                   Brand Name Dispense Instructions for use    amitriptyline 25 MG tablet    ELAVIL    90 tablet    Take 1 tablet (25 mg) by mouth At Bedtime       diclofenac sodium 3 % Gel     1 Tube    Apply to affected area up to 2 times/day       gabapentin 100 MG capsule    NEURONTIN    30 capsule    Take 1 capsule (100 mg) by mouth At Bedtime       ibuprofen 200 MG tablet    ADVIL/MOTRIN    30 tablet    Take 3 tablets (600 mg) by mouth every 8 hours as needed for mild pain       lidocaine 2 % topical gel    XYLOCAINE    30 mL    Apply topically as needed for moderate pain       lidocaine 5 % Patch    LIDODERM    30 patch    Place 1 patch onto the skin every 24 hours       NASONEX 50 MCG/ACT spray   Generic drug:  mometasone     1 Month    INHALE 2 SPRAYS IN EACH NOSTRIL ONE DAILY

## 2017-04-21 NOTE — TELEPHONE ENCOUNTER
Routing to KARISSA Burgess for any updates on progress or anticipated date approval date.    Yazmin Pabon  BSN-RN Care Coordinator  [facility]

## 2017-04-25 NOTE — TELEPHONE ENCOUNTER
Called [facility] for an update on the appeal decision, was on hold for 30 mins. Will try at a later time.        Gin LICEA    [facility]

## 2017-04-27 RX ORDER — LIDOCAINE/PRILOCAINE 2.5 %-2.5%
CREAM (GRAM) TOPICAL
Qty: 30 G | Refills: 0 | Status: SHIPPED | OUTPATIENT
Start: 2017-04-27 | End: 2022-02-23

## 2017-04-27 NOTE — TELEPHONE ENCOUNTER
PA approved. Incoming call from Lauren from [facility] approved and letter has been sent via mail 4/26/17  Effective date: 3/26/17-3/26/18  PA reference #: Pending, left VM with worker to return call  Pt. notified:   Routing to nursing to call and  Schedule.      Gin LICEA    [facility] [facility] [facility]

## 2017-04-27 NOTE — TELEPHONE ENCOUNTER
Called [facility] for second time for a status update, was on hold for 45 mins and disconnected call. I will re-fax everything to [facility] again, I am not sure what other options there are to get an update.        Gin LICEA    [facility]

## 2017-04-27 NOTE — TELEPHONE ENCOUNTER
"Called pt. Let her know that the Qutenza procedure has been approved and that we can get the procedure scheduled. Pt states that her back pain has been better since the frozen shoulder issue has been addressed but she would still like to go forward with this procedure. Reviewed the procedure process with the patient. She said that she would not be able to have a  to take her home after the procedure. Let her know that it was highly recommended because we do not know how much pain she will be in after the procedure. Pt states that she only lives about 15\" away and that she is used to living in pain.      Let her know that she will be at the clinic for about 3 hours. Pt asked if she could go to work after the procedure. Let her know that, depending on the type of work she does, it is possible but the pain may be more distracting than usual.  Pt typically works from 6 - 10 pm on Thursday evenings.  Informed her that we would send a prescription for prilocaine/lidocaine to her pharmacy and that she would need to pick it up and bring it to her appointment.  Will send pt an instruction letter via email for her to review in anticipation of the appointment.     Appointment scheduled for Thursday 5/4 at 0800.  Prescriptions for lidocaine/prilocaine and Qutenza patch prepped for processing by Dr. Card. Patient instructions emailed to pt.     GRACE Magana, RN-BC  Patient Care Supervisor/Care Coordinator  [facility]    "

## 2017-05-03 NOTE — PROGRESS NOTES
"Pre-Procedure:  Patient's Name and Date of Birth verified:  YES  Verified By:  Dr. Card  Diagnosis:  PHN (POSTHERPETIC NEURALGIA)  Interval History:  Tomasa continues to complain of burning Right sided flank pain       Site marking and Verification:  Site Selection Based on symptoms and condition.  YES  Verified by provider: Maria Alejandra Card MD   Patient ID confirmed and \"Pause for a Cause\" observed.  YES   Procedure Note:  Verbal consent obtained from the patient prior to start of the procedure.  Risks/Benefits explained. Pt expresses understanding and wishes to proceed with Qutenza. YES    Type of procedure performed:  Application of 8% Capsaisin patch (Qutenza)      Procedure Description:  The painful area of the patient's Left flank was exposed and  intact skin observed.  Careful marking of the pain area, and the area of allodynia and hyperalgesia, was completed with a surgical marker.  The Qutenza patch was cut to fit the marked area.  Then, EMLA (2.5% lidocaine and 2.5% prilocaine cream) was liberally applied to the area and allowed to remain on the skin for 30 minutes until the skin was numb to pinprick. The EMLA cream was gently removed and the area was washed with soap and water.  When the treatment areas was completely dry, the Qutenza patch(es) was applied while wearing nitrile gloves.  The patient was instructed not to touch the patch or treatment area.      Patch Lot No.   (on back of foil pouch) Portion Used (%) Portion Discarded (%) RN initials   7/04262/4A 80% 20% AH and AB                           Were interventions needed to treat acute pain associated with the procedure?        __x_ NO                 ____ YES, please describe:  Time Intervention Pt-reported Outcome RN Initials                           After 1 hour, the Qutenza patch was removed and disposed of according to biomedical waste procedures.  The area was washed with the proprietary cleanser, and then washed again with soap and water.  "     The whole procedure took 2 hours.          Post-Procedure    Dermal Assessment (i.e redness, papules, swelling): redness  Moderate to severe burning sensation occurred throughout the procedure as expected.      Complications/Adverse Effects other than anticipated:  none    Management: Take over the counter anti-inflammatory medications as needed                              Additional Plan:  Pt was asked to call with an update in status in 7-10 days.     Signature:  Maria Alejandra Card MD

## 2017-05-03 NOTE — PATIENT INSTRUCTIONS
Qutenza Procedure  Discharge Instructions      After the procedure you may experience:    Localized pain or burning sensation    Skin redness or irritation    Itching    Small blisters in the area of the treatment      The treated area may be sensitive to heat so you may want to avoid hot showers or baths, direct sunlight, and vigorous exercise for a few days.      Another treatment may be repeated as warranted by the return of pain and with the approval of Dr. Card, but not more often than every 3 months.      You may take over the counter anti-inflammatory medications as needed for discomfort from the procedure. Applying ice to the site may be helpful as well.       Please call the nurse line, [phone], with any difficulties and with an update in 7-10 days.  If you have problems after clinic hours, please go to an urgent care or emergency room to be evaluated.      ----------------------------------------------------------------  Nurse Triage line:  [phone]   Call this number with any questions or concerns. You may leave a detailed message anytime. Calls are typically returned Monday through Friday between 8 AM and 4:30 PM. We usually get back to you within 2 business days depending on the issue/request.       Medication refills:    For non-narcotic medications, call your pharmacy directly to request a refill. The pharmacy will contact the Pain Management Center for authorization. Please allow 3-4 days for these refills to be processed.     For narcotic refills, call the nurse triage line or send a [employer] message. Please contact us 7-10 days before your refill is due. The message MUST include the name of the specific medication(s) requested and how you would like to receive the prescription(s). The options are as follows:    Pain Clinic staff can mail the prescription to your pharmacy. Please tell us the name of the pharmacy.    You may pick the prescription up at the Pain Clinic (tell us the location)  or during a clinic visit with your pain provider    Pain Clinic staff can deliver the prescription to the [place] [facility] pharmacy in the clinic building. Please tell us the location.      Scheduling number: [phone].  Call this number to schedule or change appointments.    We believe regular attendance is key to your success in our program.    Any time you are unable to keep your appointment we ask that you call us at least 24 hours in advance to let us know. This will allow us to offer the appointment time to another patient.

## 2017-05-04 ENCOUNTER — OFFICE VISIT (OUTPATIENT)
Dept: PALLIATIVE MEDICINE | Facility: CLINIC | Age: 52
End: 2017-05-04
Payer: COMMERCIAL

## 2017-05-04 VITALS
OXYGEN SATURATION: 99 % | BODY MASS INDEX: 29.1 KG/M2 | DIASTOLIC BLOOD PRESSURE: 76 MMHG | HEART RATE: 66 BPM | WEIGHT: 149 LBS | SYSTOLIC BLOOD PRESSURE: 126 MMHG

## 2017-05-04 DIAGNOSIS — B02.29 POST HERPETIC NEURALGIA: Primary | ICD-10-CM

## 2017-05-04 PROCEDURE — 99213 OFFICE O/P EST LOW 20 MIN: CPT | Performed by: ANESTHESIOLOGY

## 2017-05-04 ASSESSMENT — PAIN SCALES - GENERAL: PAINLEVEL: MODERATE PAIN (5)

## 2017-05-04 NOTE — NURSING NOTE
Pt reported painful burning sensation at the treated area throughout the procedure. Pt tolerated procedure without additional interventions for pain. Skin in treated area was red after patch removed. When cleansing procedure completed, provided pt with an cold pack to apply to the site. Pt reported that the cold pack was helpful in diminishing the pain. Reviewed post procedure instructions with the patient.  She will try to remember to call with an update in 7-10 days.     GRACE Magana, RN-BC  Patient Care Supervisor/Care Coordinator  [facility]

## 2017-05-04 NOTE — MR AVS SNAPSHOT
After Visit Summary   5/4/2017    Tomasa Darling    MRN: [id number]           Patient Information     Date Of Birth          1965        Visit Information        Provider Department      5/4/2017 8:00 AM Maria Alejandra Card MD [facility]        Today's Diagnoses     Post herpetic neuralgia    -  1      Care Instructions    Qutenza Procedure  Discharge Instructions      After the procedure you may experience:    Localized pain or burning sensation    Skin redness or irritation    Itching    Small blisters in the area of the treatment      The treated area may be sensitive to heat so you may want to avoid hot showers or baths, direct sunlight, and vigorous exercise for a few days.      Another treatment may be repeated as warranted by the return of pain and with the approval of Dr. Card, but not more often than every 3 months.      You may take over the counter anti-inflammatory medications as needed for discomfort from the procedure. Applying ice to the site may be helpful as well.       Please call the nurse line, [phone], with any difficulties and with an update in 7-10 days.  If you have problems after clinic hours, please go to an urgent care or emergency room to be evaluated.      ----------------------------------------------------------------  Nurse Triage line:  [phone]   Call this number with any questions or concerns. You may leave a detailed message anytime. Calls are typically returned Monday through Friday between 8 AM and 4:30 PM. We usually get back to you within 2 business days depending on the issue/request.       Medication refills:    For non-narcotic medications, call your pharmacy directly to request a refill. The pharmacy will contact the [facility] Management Center for authorization. Please allow 3-4 days for these refills to be processed.     For narcotic refills, call the nurse triage line or send a [employer] message. Please contact us 7-10 days before  your refill is due. The message MUST include the name of the specific medication(s) requested and how you would like to receive the prescription(s). The options are as follows:    Pain Clinic staff can mail the prescription to your pharmacy. Please tell us the name of the pharmacy.    You may pick the prescription up at the Pain Clinic (tell us the location) or during a clinic visit with your pain provider    Pain Clinic staff can deliver the prescription to the [facility] pharmacy in the clinic building. Please tell us the location.      Scheduling number: [phone].  Call this number to schedule or change appointments.    We believe regular attendance is key to your success in our program.    Any time you are unable to keep your appointment we ask that you call us at least 24 hours in advance to let us know. This will allow us to offer the appointment time to another patient.             Follow-ups after your visit        Who to contact     If you have questions or need follow up information about today's clinic visit or your schedule please contact [facility] directly at [phone].  Normal or non-critical lab and imaging results will be communicated to you by SnapMDhart, letter or phone within 4 business days after the clinic has received the results. If you do not hear from us within 7 days, please contact the clinic through SnapMDhart or phone. If you have a critical or abnormal lab result, we will notify you by phone as soon as possible.  Submit refill requests through SocialProof or call your pharmacy and they will forward the refill request to us. Please allow 3 business days for your refill to be completed.          Additional Information About Your Visit        SocialProof Information     SocialProof gives you secure access to your electronic health record. If you see a primary care provider, you can also send messages to your care team and make appointments. If you have questions, please call your  primary care clinic.  If you do not have a primary care provider, please call [phone] and they will assist you.        Care EveryWhere ID     This is your Care EveryWhere ID. This could be used by other organizations to access your [facility] medical records  [id number]        Your Vitals Were     Pulse Last Period Pulse Oximetry BMI (Body Mass Index)          66 07/31/2012 99% 29.1 kg/m2         Blood Pressure from Last 3 Encounters:   05/04/17 104/70   04/13/17 115/77   02/02/17 94/72    Weight from Last 3 Encounters:   05/04/17 67.6 kg (149 lb)   04/13/17 67.6 kg (149 lb)   02/02/17 67.1 kg (148 lb)              Today, you had the following     No orders found for display       Primary Care Provider Office Phone # Fax #    Pablobettycory GENO Green [phone]       [facility] [address] [place] [address]        Thank you!     Thank you for choosing [facility]  for your care. Our goal is always to provide you with excellent care. Hearing back from our patients is one way we can continue to improve our services. Please take a few minutes to complete the written survey that you may receive in the mail after your visit with us. Thank you!             Your Updated Medication List - Protect others around you: Learn how to safely use, store and throw away your medicines at www.disposemymeds.org.          This list is accurate as of: 5/4/17 10:28 AM.  Always use your most recent med list.                   Brand Name Dispense Instructions for use    amitriptyline 25 MG tablet    ELAVIL    90 tablet    Take 1 tablet (25 mg) by mouth At Bedtime       diclofenac sodium 3 % Gel     1 Tube    Apply to affected area up to 2 times/day       gabapentin 100 MG capsule    NEURONTIN    30 capsule    Take 1 capsule (100 mg) by mouth At Bedtime       ibuprofen 200 MG tablet    ADVIL/MOTRIN    30 tablet    Take 3 tablets (600 mg) by mouth every 8 hours as needed for mild  pain       lidocaine 2 % topical gel    XYLOCAINE    30 mL    Apply topically as needed for moderate pain       lidocaine 5 % Patch    LIDODERM    30 patch    Place 1 patch onto the skin every 24 hours       lidocaine-prilocaine cream    EMLA    30 g    Apply topically to area to be treated with Qutenza patches; bring to procedure appointment       NASONEX 50 MCG/ACT spray   Generic drug:  mometasone     1 Month    INHALE 2 SPRAYS IN EACH NOSTRIL ONE DAILY

## 2017-05-04 NOTE — NURSING NOTE
Chief Complaint   Patient presents with     Pain     Follow up       Initial /73  Pulse 64  Wt 67.6 kg (149 lb)  LMP 07/31/2012  SpO2 99%  BMI 29.1 kg/m2 Estimated body mass index is 29.1 kg/(m^2) as calculated from the following:    Height as of 11/25/16: 1.524 m (5').    Weight as of this encounter: 67.6 kg (149 lb).  Medication Reconciliation: floresita Sanchez [facility]

## 2017-05-05 ENCOUNTER — TELEPHONE (OUTPATIENT)
Dept: PALLIATIVE MEDICINE | Facility: CLINIC | Age: 52
End: 2017-05-05

## 2017-05-05 NOTE — TELEPHONE ENCOUNTER
Spoke with Verona in pharmacy below. Let her know I would speak to nurse/PCS Sherron Modi, RN, who performed the procedure.     Mary Lopez, MSN, RN-BC  Care Coordinator  [facility] [place]

## 2017-05-05 NOTE — TELEPHONE ENCOUNTER
Verona Hinton from [facility] pharmacy LM at 1030 with a question about an order that was filled for this pt for a procedure that was done yesterday. Please call. Phone # [phone].    Heather Haas    [facility]

## 2017-07-11 ENCOUNTER — OFFICE VISIT (OUTPATIENT)
Dept: PODIATRY | Facility: CLINIC | Age: 52
End: 2017-07-11
Payer: COMMERCIAL

## 2017-07-11 VITALS
WEIGHT: 148 LBS | BODY MASS INDEX: 29.06 KG/M2 | HEIGHT: 60 IN | DIASTOLIC BLOOD PRESSURE: 70 MMHG | SYSTOLIC BLOOD PRESSURE: 120 MMHG

## 2017-07-11 DIAGNOSIS — L84 CALLUS: ICD-10-CM

## 2017-07-11 DIAGNOSIS — M25.551 RIGHT HIP PAIN: Primary | ICD-10-CM

## 2017-07-11 PROCEDURE — 11056 PARNG/CUTG B9 HYPRKR LES 2-4: CPT | Performed by: PODIATRIST

## 2017-07-11 PROCEDURE — 99213 OFFICE O/P EST LOW 20 MIN: CPT | Mod: 25 | Performed by: PODIATRIST

## 2017-07-11 NOTE — Clinical Note
Good morning  I saw Tomasa today for bilateral foot pain related to nucleated calluses.  These were debrided and she was given our home callus instructions.  She was also referred to Sports Med for chronic R hip pain.  She'll follow up prn.  Thanks  Fausto

## 2017-07-11 NOTE — NURSING NOTE
Chief Complaint   Patient presents with     Plantar Wart     BL foot x 2 years       Initial Ht 5' (1.524 m)  Wt 148 lb (67.1 kg)  LMP 07/31/2012  BMI 28.9 kg/m2 Estimated body mass index is 28.9 kg/(m^2) as calculated from the following:    Height as of this encounter: 5' (1.524 m).    Weight as of this encounter: 148 lb (67.1 kg).  Medication Reconciliation: complete

## 2017-07-11 NOTE — PATIENT INSTRUCTIONS
DR. COLLINS'S CLINIC LOCATIONS:   MONDAY - [place] TUESDAY - [facility]   [address] [facility] [address] [facility] [address]   [place] [address] [place] [address]   [phone]       THURSDAY AM - [place] [facility] PM [facility]   [address]   [place] [address] [place] [address]   [phone]       FRIDAY AM - [place] SCHEDULE SURGERY: [phone] [address] APPOINTMENTS: [phone]   [place] [address] AFTER HOURS: [phone] FAX NUMBER: [phone]     CALLUS / CORN / IPK    When there is excessive friction or pressure on the skin, the body responds by making the skin thicker to protect the deeper structures from becoming exposed. While this works well to protect the deeper structures, the thickened skin can cause increased pressure and pain.    Callus: flat, diffuse thickened areas are simple calluses and they are usually caused by friction. Often these are the result of rubbing on a shoe or from going barefoot.    Corns: calluses with a central core on or between the toes are called corns. These result from prominent joints on toes rubbing together. Theses are a symptom of bone malalignment or illfitting shoes and will always recur unless the underlying bones are addressed surgically.    IPK: calluses with a central core on the ball of the foot are usually IPKs (intractable plantar keratosis). These are caused by excessive pressure from the metatarsals, the bones that make up the ball of the foot. Often one of these bones is too long or too prominent. Again, these will always recur unless the underlying bone issue is addressed. There is no cure for these. They will either go away by themselves, recur, or more could develop.    Home Treatment  - File: Trim them down with a pumice stone or callus file a couple times a week to remove callus tissue that builds up. An electric callus removing device. Amope  Pedi Perfect Electronic Pedicure Foot File and Callus Remover can be a good option.   - Moisturize: Lotion can be applied to soften the callus. A lactic acid or urea based cream such as Carmol, Kersal or Vanicream or thicker cream with shea butter are good options.   - Foot Gear: Good supportive shoes and minimizing barefoot walking can slow down callus formation and can decrease pain levels. Gel inserts can also provide padding to the bottom of the foot to prevent pain and slow recurrence. Toe spacers, toe covers, can custom orthotic inserts can be beneficial as well.  - Surgery: If there is a surgical pathology noted, such as a prominent bone, often this needs to be addressed surgically to minimize recurrence. However, sometimes the lesion simply migrates to another spot after surgery, so it is not a guaranteed cure.     If you cannot treat them yourself at home, call the home foot care nurses below:  Happy Feet  [phone] Twinkle Toes   [phone] Footworks   [phone]               Over the Counter Inserts    Super Feet are the most common and easiest to find.    Locations include any [employer] Shoes Store, [employer]al[employer]injas in [place] on [address] [address] and in [place] on [facility] [address], Enigmedia in [facility] on [facility], [facility] Running Room in [facility] on [facility], [facility] Running Room in [place] on [address], Funambol in [place] on [address] [address] and [employer] Sport Shop in [facility] on [place] and in [place] on [facility].    Sofsole Fit can be found at [employer] in [place].  You can also find them online at Sofsole.com    Spenco can be found online and at [employer] Shoe Shop in [facility] on [address], Run N' Fun in [facility] on [place], Gear Running Store in [place] on [facility], Enig[employer] in [place] on [address] and [employer] in [place] on [address].    Power Step can be a little harder to find.  Locations include  Run N' Fun in [place] on [address], [place] in [facility], Stop-over Store in [place] on [address] and online    Walk-Fit - Target     [facility] - [facility]    **  A good high quality over the counter insert can cost around $40-$50.                      Body Mass Index (BMI)  Many things can cause foot and ankle problems. Foot structure, activity level, foot mechanics and injuries are common causes of pain.  One very important issue that often goes unmentioned, is body weight.  Extra weight can cause increased stress on muscles, ligaments, bones and tendons.  Sometimes just a few extra pounds is all it takes to put one over her/his threshold. Without reducing that stress, it can be difficult to alleviate pain. Some people are uncomfortable addressing this issue, but we feel it is important for you to think about it. As Foot &  Ankle specialists, our job is addressing the lower extremity problem and possible causes. Regarding extra body weight, we encourage patients to discuss diet and weight management plans with their primary care doctors. It is this team approach that gives you the best opportunity for pain relief and getting you back on your feet.

## 2017-07-11 NOTE — PROGRESS NOTES
"Foot & Ankle Surgery   July 11, 2017    S:  Pt is seen today for evaluation of \"warts\" bilateral foot.  Painful, karolina with increased activities.  Has been trying OTC wart medicaiton, but it \"burns\" her foot.  She also is wondering about if she needs orthotics.  She had a pair in college but hasn't used them.  Wondering if her arch needs orthotics, but the main issue is her R hip.  Chronic problems with the hip, \"it goes out\", sees a chiropractor but has not seen an Ortho/Sports Med doc..    Vitals:    07/11/17 1039   Weight: 148 lb (67.1 kg)   Height: 5' (1.524 m)   '      ROS - Pos for CC.  Patient denies current nausea, vomiting, chills, fevers, belly pain, calf pain, chest pain or SOB.  Complete remainder of ROS it otherwise neg.      PE:  Gen:   No apparent distress  Neuro:   A&Ox3, no deficits  Psych:    Answering questions appropriately for age and situation with normal affect  Head:    NCAT  Eye:    Visual scanning without deficit  Ear:    Response to auditory stimuli wnl  Lung:    Non-labored breathing on RA noted  Abd:    NTND per patient report  Lymph:    Neg for pitting/non-pitting edema BLE  Vasc:    Pulses palpable, CFT minimally delayed  Neuro:    Light touch sensation intact to all sensory nerve distributions without paresthesias  Derm:    Skin lesion plantar L forefoot x 1 and plantar R 5th MPJ x 1, consistent with IPK.  Debrided, core removed, no evidence of verrucae  MSK:    ROM, strength wnl without limitation, no pain on palpation noted.  No right lower extremity arch problems  Calf:    Neg for redness, swelling or tenderness      Assessment:  51 year old female with callus x 2 bilateral foot; R hip chronic pain/instability      Plan:  Discussed etiologies/options  1.  callus  -debrided with 15 blade x 2 without incident  -home callus instructions dispensed and discussed  -consider biopsy    2.  R hip issue  -OTC insert handout dispensed  -discussed orthotics, she will call for order  -advised " being seen by specialist for issue, based on pain/chronicity.  Referral to [facility] or [facility]     Follow up:  Prn or sooner with acute issues      Body mass index is 28.9 kg/(m^2).  Weight management plan: Patient was referred to their PCP to discuss a diet and exercise plan.         Fausto Barnes DPM   Podiatric Foot & Ankle Surgeon  [facility]  [phone]

## 2017-07-11 NOTE — MR AVS SNAPSHOT
After Visit Summary   7/11/2017    Tomasa Darling    MRN: [id number]           Patient Information     Date Of Birth          1965        Visit Information        Provider Department      7/11/2017 10:45 AM Fausto Collins DPM [facility]        Care Instructions      DR. COLLINS'S CLINIC LOCATIONS:   MONDAY - [place] TUESDAY - [facility]   [address] [facility] [address] [facility] [address]   [place] [address] [place] [address]   [phone]       THURSDAY AM - [place] [facility]   [address]   [place] [address] [place] [address]   [phone]       FRIDAY AM - [place] SCHEDULE SURGERY: [phone] [address] APPOINTMENTS: [phone]   [place] [address] AFTER HOURS: [phone] FAX NUMBER: [phone]     CALLUS / CORN / IPK    When there is excessive friction or pressure on the skin, the body responds by making the skin thicker to protect the deeper structures from becoming exposed. While this works well to protect the deeper structures, the thickened skin can cause increased pressure and pain.    Callus: flat, diffuse thickened areas are simple calluses and they are usually caused by friction. Often these are the result of rubbing on a shoe or from going barefoot.    Corns: calluses with a central core on or between the toes are called corns. These result from prominent joints on toes rubbing together. Theses are a symptom of bone malalignment or illfitting shoes and will always recur unless the underlying bones are addressed surgically.    IPK: calluses with a central core on the ball of the foot are usually IPKs (intractable plantar keratosis). These are caused by excessive pressure from the metatarsals, the bones that make up the ball of the foot. Often one of these bones is too long or too prominent. Again, these will always recur unless the underlying  bone issue is addressed. There is no cure for these. They will either go away by themselves, recur, or more could develop.    Home Treatment  - File: Trim them down with a pumice stone or callus file a couple times a week to remove callus tissue that builds up. An electric callus removing device. Amope Pedi Perfect Electronic Pedicure Foot File and Callus Remover can be a good option.   - Moisturize: Lotion can be applied to soften the callus. A lactic acid or urea based cream such as Carmol, Kersal or Vanicream or thicker cream with shea butter are good options.   - Foot Gear: Good supportive shoes and minimizing barefoot walking can slow down callus formation and can decrease pain levels. Gel inserts can also provide padding to the bottom of the foot to prevent pain and slow recurrence. Toe spacers, toe covers, can custom orthotic inserts can be beneficial as well.  - Surgery: If there is a surgical pathology noted, such as a prominent bone, often this needs to be addressed surgically to minimize recurrence. However, sometimes the lesion simply migrates to another spot after surgery, so it is not a guaranteed cure.     If you cannot treat them yourself at home, call the home foot care nurses below:  Happy Feet  [phone] Twinkle Toes   [phone] Footworks   [phone]               Over the Counter Inserts    Super Feet are the most common and easiest to find.    Locations include any [employer] Store, [employer]eva in [place] on [address] [address] and in [place] on [facility] [address], [facility] in \[facility] [place] [facility]\"" on [facility], [facility] Running Room in [facility], [facility] Running Room in [place] on [facility] [address], ticketstreet in [place] on [facility] [address] and All[employer]reed Sport Shop in \[facility] [place] [facility]\"" on [place] and in [place] on [facility].    Fresh Nationsole Fit can be found at [employer] in [place].  You can also find them online at Sofsole.com    Margaret  can be found online and at [employer] Shoe Shop in [place] on [address], Run N' Fun in [facility] on [address], Gear Running Store in [place] on [address], [employer] in [place] on [address] and [employer] in [place] on [address].    Power Step can be a little harder to find.  Locations include Run N' Fun in [place] on [address], [place] in [facility] [place], Stop-over Store in [place] on [address] and online    Walk-Fit - Target     [facility]    **  A good high quality over the counter insert can cost around $40-$50.                      Body Mass Index (BMI)  Many things can cause foot and ankle problems. Foot structure, activity level, foot mechanics and injuries are common causes of pain.  One very important issue that often goes unmentioned, is body weight.  Extra weight can cause increased stress on muscles, ligaments, bones and tendons.  Sometimes just a few extra pounds is all it takes to put one over her/his threshold. Without reducing that stress, it can be difficult to alleviate pain. Some people are uncomfortable addressing this issue, but we feel it is important for you to think about it. As Foot &  Ankle specialists, our job is addressing the lower extremity problem and possible causes. Regarding extra body weight, we encourage patients to discuss diet and weight management plans with their primary care doctors. It is this team approach that gives you the best opportunity for pain relief and getting you back on your feet.            Follow-ups after your visit        Who to contact     If you have questions or need follow up information about today's clinic visit or your schedule please contact [facility] directly at [phone].  Normal or non-critical lab and imaging results will be communicated to you by MyChart, letter or phone within 4 business days after the clinic has received the results. If you do not hear from us within 7 days, please contact  the clinic through [employer] or phone. If you have a critical or abnormal lab result, we will notify you by phone as soon as possible.  Submit refill requests through [employer] or call your pharmacy and they will forward the refill request to us. Please allow 3 business days for your refill to be completed.          Additional Information About Your Visit        [employer] Information     [employer] gives you secure access to your electronic health record. If you see a primary care provider, you can also send messages to your care team and make appointments. If you have questions, please call your primary care clinic.  If you do not have a primary care provider, please call [phone] and they will assist you.        Care EveryWhere ID     This is your Care EveryWhere ID. This could be used by other organizations to access your [place] medical records  [id number]        Your Vitals Were     Height Last Period BMI (Body Mass Index)             5' (1.524 m) 07/31/2012 28.9 kg/m2          Blood Pressure from Last 3 Encounters:   05/04/17 126/76   04/13/17 115/77   02/02/17 94/72    Weight from Last 3 Encounters:   07/11/17 148 lb (67.1 kg)   05/04/17 149 lb (67.6 kg)   04/13/17 149 lb (67.6 kg)              Today, you had the following     No orders found for display       Primary Care Provider Office Phone # Fax #    Krishnakumari G MD Peter [phone]       [facility] [address] [place] [address]        Equal Access to Services     SERINA LUNA : Hadii aad ku hadasho Soomaali, waaxda luqadaha, qaybta kaalmada adeegyada, waxay ga mo . So [facility] [phone].    ATENCIÓN: Si habla jana, tiene a rosas disposición servicios gratuitos de asistencia lingüística. Llame al [phone].    We comply with applicable federal civil rights laws and [place] laws. We do not discriminate on the basis of race, color, national origin, age, disability sex, sexual  orientation or gender identity.            Thank you!     Thank you for choosing [facility]  for your care. Our goal is always to provide you with excellent care. Hearing back from our patients is one way we can continue to improve our services. Please take a few minutes to complete the written survey that you may receive in the mail after your visit with us. Thank you!             Your Updated Medication List - Protect others around you: Learn how to safely use, store and throw away your medicines at www.disposemymeds.org.          This list is accurate as of: 7/11/17 10:55 AM.  Always use your most recent med list.                   Brand Name Dispense Instructions for use Diagnosis    amitriptyline 25 MG tablet    ELAVIL    90 tablet    Take 1 tablet (25 mg) by mouth At Bedtime    Post herpetic neuralgia       diclofenac sodium 3 % Gel     1 Tube    Apply to affected area up to 2 times/day    Post herpetic neuralgia       gabapentin 100 MG capsule    NEURONTIN    30 capsule    Take 1 capsule (100 mg) by mouth At Bedtime    Post herpetic neuralgia       ibuprofen 200 MG tablet    ADVIL/MOTRIN    30 tablet    Take 3 tablets (600 mg) by mouth every 8 hours as needed for mild pain        lidocaine 2 % topical gel    XYLOCAINE    30 mL    Apply topically as needed for moderate pain    Post herpetic neuralgia       lidocaine 5 % Patch    LIDODERM    30 patch    Place 1 patch onto the skin every 24 hours    Post herpetic neuralgia       lidocaine-prilocaine cream    EMLA    30 g    Apply topically to area to be treated with Qutenza patches; bring to procedure appointment    Post herpetic neuralgia       NASONEX 50 MCG/ACT spray   Generic drug:  mometasone     1 Month    INHALE 2 SPRAYS IN EACH NOSTRIL ONE DAILY    Chronic rhinitis

## 2017-10-26 ENCOUNTER — TELEPHONE (OUTPATIENT)
Dept: PALLIATIVE MEDICINE | Facility: CLINIC | Age: 52
End: 2017-10-26

## 2017-10-26 NOTE — TELEPHONE ENCOUNTER
qutenza patch in cabinet . Destroyed/blackboxed    Yazmin Pabon  BSN-RN Care Coordinator  [facility]

## 2019-11-07 ENCOUNTER — HEALTH MAINTENANCE LETTER (OUTPATIENT)
Age: 54
End: 2019-11-07

## 2020-02-23 ENCOUNTER — HEALTH MAINTENANCE LETTER (OUTPATIENT)
Age: 55
End: 2020-02-23

## 2020-12-06 ENCOUNTER — HEALTH MAINTENANCE LETTER (OUTPATIENT)
Age: 55
End: 2020-12-06

## 2021-01-31 ENCOUNTER — IMMUNIZATION (OUTPATIENT)
Dept: NURSING | Facility: CLINIC | Age: 56
End: 2021-01-31
Payer: COMMERCIAL

## 2021-01-31 PROCEDURE — 91300 PR COVID VAC PFIZER DIL RECON 30 MCG/0.3 ML IM: CPT

## 2021-01-31 PROCEDURE — 0001A PR COVID VAC PFIZER DIL RECON 30 MCG/0.3 ML IM: CPT

## 2021-02-21 ENCOUNTER — IMMUNIZATION (OUTPATIENT)
Dept: NURSING | Facility: CLINIC | Age: 56
End: 2021-02-21
Attending: INTERNAL MEDICINE
Payer: COMMERCIAL

## 2021-02-21 PROCEDURE — 91300 PR COVID VAC PFIZER DIL RECON 30 MCG/0.3 ML IM: CPT

## 2021-02-21 PROCEDURE — 0002A PR COVID VAC PFIZER DIL RECON 30 MCG/0.3 ML IM: CPT

## 2021-09-25 ENCOUNTER — HEALTH MAINTENANCE LETTER (OUTPATIENT)
Age: 56
End: 2021-09-25

## 2021-11-20 ENCOUNTER — HEALTH MAINTENANCE LETTER (OUTPATIENT)
Age: 56
End: 2021-11-20

## 2022-01-15 ENCOUNTER — HEALTH MAINTENANCE LETTER (OUTPATIENT)
Age: 57
End: 2022-01-15

## 2022-02-23 ENCOUNTER — OFFICE VISIT (OUTPATIENT)
Dept: INTERNAL MEDICINE | Facility: CLINIC | Age: 57
End: 2022-02-23
Payer: COMMERCIAL

## 2022-02-23 VITALS
TEMPERATURE: 96.2 F | BODY MASS INDEX: 27.47 KG/M2 | OXYGEN SATURATION: 99 % | DIASTOLIC BLOOD PRESSURE: 70 MMHG | HEIGHT: 60 IN | SYSTOLIC BLOOD PRESSURE: 116 MMHG | HEART RATE: 72 BPM | WEIGHT: 139.9 LBS | RESPIRATION RATE: 14 BRPM

## 2022-02-23 DIAGNOSIS — E78.00 HYPERCHOLESTEREMIA: ICD-10-CM

## 2022-02-23 DIAGNOSIS — Z00.00 ROUTINE HISTORY AND PHYSICAL EXAMINATION OF ADULT: Primary | ICD-10-CM

## 2022-02-23 LAB
ALBUMIN SERPL-MCNC: 3.5 G/DL (ref 3.4–5)
ALP SERPL-CCNC: 113 U/L (ref 40–150)
ALT SERPL W P-5'-P-CCNC: 28 U/L (ref 0–50)
ANION GAP SERPL CALCULATED.3IONS-SCNC: 6 MMOL/L (ref 3–14)
AST SERPL W P-5'-P-CCNC: 18 U/L (ref 0–45)
BILIRUB SERPL-MCNC: 0.3 MG/DL (ref 0.2–1.3)
BUN SERPL-MCNC: 11 MG/DL (ref 7–30)
CALCIUM SERPL-MCNC: 9 MG/DL (ref 8.5–10.1)
CHLORIDE BLD-SCNC: 107 MMOL/L (ref 94–109)
CHOLEST SERPL-MCNC: 264 MG/DL
CO2 SERPL-SCNC: 26 MMOL/L (ref 20–32)
CREAT SERPL-MCNC: 0.83 MG/DL (ref 0.52–1.04)
FASTING STATUS PATIENT QL REPORTED: YES
GFR SERPL CREATININE-BSD FRML MDRD: 81 ML/MIN/1.73M2
GLUCOSE BLD-MCNC: 100 MG/DL (ref 70–99)
HDLC SERPL-MCNC: 50 MG/DL
HGB BLD-MCNC: 12.1 G/DL (ref 11.7–15.7)
LDLC SERPL CALC-MCNC: 180 MG/DL
NONHDLC SERPL-MCNC: 214 MG/DL
POTASSIUM BLD-SCNC: 4.1 MMOL/L (ref 3.4–5.3)
PROT SERPL-MCNC: 7.6 G/DL (ref 6.8–8.8)
SODIUM SERPL-SCNC: 139 MMOL/L (ref 133–144)
TRIGL SERPL-MCNC: 171 MG/DL
TSH SERPL DL<=0.005 MIU/L-ACNC: 2.07 MU/L (ref 0.4–4)

## 2022-02-23 PROCEDURE — 36415 COLL VENOUS BLD VENIPUNCTURE: CPT | Performed by: INTERNAL MEDICINE

## 2022-02-23 PROCEDURE — G0145 SCR C/V CYTO,THINLAYER,RESCR: HCPCS | Performed by: INTERNAL MEDICINE

## 2022-02-23 PROCEDURE — 85018 HEMOGLOBIN: CPT | Performed by: INTERNAL MEDICINE

## 2022-02-23 PROCEDURE — 84443 ASSAY THYROID STIM HORMONE: CPT | Performed by: INTERNAL MEDICINE

## 2022-02-23 PROCEDURE — 80053 COMPREHEN METABOLIC PANEL: CPT | Performed by: INTERNAL MEDICINE

## 2022-02-23 PROCEDURE — 80061 LIPID PANEL: CPT | Performed by: INTERNAL MEDICINE

## 2022-02-23 PROCEDURE — 99386 PREV VISIT NEW AGE 40-64: CPT | Performed by: INTERNAL MEDICINE

## 2022-02-23 PROCEDURE — 87624 HPV HI-RISK TYP POOLED RSLT: CPT | Performed by: INTERNAL MEDICINE

## 2022-02-23 ASSESSMENT — ENCOUNTER SYMPTOMS
EYE PAIN: 0
MYALGIAS: 0
ARTHRALGIAS: 0
NAUSEA: 0
FREQUENCY: 0
DIZZINESS: 0
FEVER: 0
PARESTHESIAS: 1
COUGH: 0
NERVOUS/ANXIOUS: 1
HEARTBURN: 0
PALPITATIONS: 0
BREAST MASS: 0
DYSURIA: 0
HEMATURIA: 0
WEAKNESS: 0
CHILLS: 0
SHORTNESS OF BREATH: 0
SORE THROAT: 0
HEMATOCHEZIA: 0
HEADACHES: 1
CONSTIPATION: 0
DIARRHEA: 0
JOINT SWELLING: 0
ABDOMINAL PAIN: 0

## 2022-02-23 NOTE — NURSING NOTE
/70   Pulse 72   Temp (!) 96.2  F (35.7  C) (Axillary)   Resp 14   Ht 1.524 m (5')   Wt 63.5 kg (139 lb 14.4 oz)   LMP 07/31/2012   SpO2 99%   BMI 27.32 kg/m    Patient in for Female Px and pap.

## 2022-02-23 NOTE — PROGRESS NOTES
SUBJECTIVE:   CC: Tomasa Darling is an 56 year old woman who presents for preventive health visit.       Patient has been advised of split billing requirements and indicates understanding: Yes  Healthy Habits:     Getting at least 3 servings of Calcium per day:  Yes    Bi-annual eye exam:  Yes    Dental care twice a year:  Yes    Sleep apnea or symptoms of sleep apnea:  None    Diet:  Regular (no restrictions)    Frequency of exercise:  2-3 days/week    Duration of exercise:  30-45 minutes    Taking medications regularly:  Yes    Medication side effects:  None    PHQ-2 Total Score: 0    Additional concerns today:  Yes     Hyperlipidemia Follow-Up      Are you regularly taking any medication or supplement to lower your cholesterol?   No    Are you having muscle aches or other side effects that you think could be caused by your cholesterol lowering medication?  NA      Today's PHQ-2 Score:   PHQ-2 ( 1999 [employer]) 2/23/2022   Q1: Little interest or pleasure in doing things 0   Q2: Feeling down, depressed or hopeless 0   PHQ-2 Score 0   Q1: Little interest or pleasure in doing things Not at all   Q2: Feeling down, depressed or hopeless Not at all   PHQ-2 Score 0       Abuse: Current or Past (Physical, Sexual or Emotional) - No  Do you feel safe in your environment? Yes    Have you ever done Advance Care Planning? (For example, a Health Directive, POLST, or a discussion with a medical provider or your loved ones about your wishes): Yes, advance care planning is on file.      Past Medical History:   Diagnosis Date     Chronic rhinitis      Hypercholesteremia        Past Surgical History:   Procedure Laterality Date     APPENDECTOMY         Current Outpatient Medications   Medication Sig Dispense Refill     NASONEX 50 MCG/ACT NA SUSP INHALE 2 SPRAYS IN EACH NOSTRIL ONE DAILY 1 Month 0-MD visit needed       Family History   Problem Relation Age of Onset     Cancer Brother         bone and lung cancer     Heart Disease  Father         4 heart attacks     C.A.D. Father         rhematic fever as a child;  of MI     Musculoskeletal Disorder Father         gout     Family History Negative Mother      C.A.D. Mother         aortic valve issue     Cerebrovascular Disease Mother      Unknown/Adopted Brother         suicide     Psychotic Disorder Brother         depression       Social History     Tobacco Use     Smoking status: Former Smoker     Packs/day: 0.50     Years: 20.00     Pack years: 10.00     Types: Cigarettes     Quit date: 3/8/2009     Years since quittin.9     Smokeless tobacco: Never Used   Substance Use Topics     Alcohol use: No     If you drink alcohol do you typically have >3 drinks per day or >7 drinks per week? No    Alcohol Use 2022   Prescreen: >3 drinks/day or >7 drinks/week? No   Prescreen: >3 drinks/day or >7 drinks/week? -   No flowsheet data found.    Reviewed orders with patient.  Reviewed health maintenance and updated orders accordingly - Yes  Lab work is in process    Breast Cancer Screening:   Pertinent mammograms are reviewed under the imaging tab.    History of abnormal Pap smear: NO - age 30-65 PAP every 5 years with negative HPV co-testing recommended  PAP / HPV 2007   PAP (Historical) NIL     Reviewed and updated as needed this visit by clinical staff   Tobacco     Med Hx  Surg Hx  Fam Hx  Soc Hx        Reviewed and updated as needed this visit by Provider                     Review of Systems   Constitutional: Negative for chills and fever.   HENT: Negative for ear pain, hearing loss and sore throat.    Eyes: Negative for pain and visual disturbance.   Respiratory: Negative for cough and shortness of breath.    Cardiovascular: Negative for chest pain, palpitations and peripheral edema.   Gastrointestinal: Negative for abdominal pain, constipation, diarrhea, heartburn, hematochezia and nausea.   Breasts:  Negative for tenderness, breast mass and discharge.   Genitourinary:  Negative for dysuria, frequency, genital sores, hematuria, pelvic pain, urgency, vaginal bleeding and vaginal discharge.   Musculoskeletal: Negative for arthralgias, joint swelling and myalgias.   Skin: Negative for rash.   Neurological: Negative for dizziness and weakness.   Psychiatric/Behavioral: Negative for mood changes. The patient is nervous/anxious.         Patient does not want to see therapist or start any medications      OBJECTIVE:   /70   Pulse 72   Temp (!) 96.2  F (35.7  C) (Axillary)   Resp 14   Ht 1.524 m (5')   Wt 63.5 kg (139 lb 14.4 oz)   LMP 07/31/2012   SpO2 99%   BMI 27.32 kg/m    Physical Exam  GENERAL APPEARANCE: healthy, alert and no distress  EYES: Eyes grossly normal to inspection, PERRL and conjunctivae and sclerae normal  NECK: no adenopathy, no asymmetry, masses, or scars and thyroid normal to palpation  RESP: lungs clear to auscultation - no rales, rhonchi or wheezes  BREAST: normal without masses, tenderness or nipple discharge and no palpable axillary masses or adenopathy  CV: regular rate and rhythm, normal S1 S2, no S3 or S4, no murmur, click or rub, no peripheral edema and peripheral pulses strong  ABDOMEN: soft, nontender, no hepatosplenomegaly, no masses and bowel sounds normal   (female): normal female external genitalia, normal urethral meatus, vaginal mucosal atrophy noted, normal cervix, adnexae, and without masses or abnormal discharge, pap obtained   MS: no musculoskeletal defects are noted and gait is age appropriate without ataxia  NEURO: Normal strength and tone, sensory exam grossly normal, mentation intact and speech normal  PSYCH: mentation appears normal and affect normal/bright    ASSESSMENT/PLAN:      (Z00.00) Routine history and physical examination of adult  (primary encounter diagnosis)  Plan: Lipid panel reflex to direct LDL Fasting,         Comprehensive metabolic panel, Hemoglobin, TSH         with free T4 reflex, MA Screen Bilateral          w/Franki, CANCELED: MA Screening Digital         Bilateral            (E78.00) Hypercholesteremia  Plan: Check lipid panel, not on medications at this time continue to follow low-fat diet regular exercise      Patient has been advised of split billing requirements and indicates understanding: Yes    COUNSELING:  Reviewed preventive health counseling, as reflected in patient instructions       Regular exercise       Healthy diet/nutrition       Immunizations    Discussed Shingrix vaccine and patient due for tetanus vaccine, patient states she will think about it      Estimated body mass index is 27.32 kg/m  as calculated from the following:    Height as of this encounter: 1.524 m (5').    Weight as of this encounter: 63.5 kg (139 lb 14.4 oz).    Weight management plan: Discussed healthy diet and exercise guidelines    She reports that she quit smoking about 12 years ago. Her smoking use included cigarettes. She has a 10.00 pack-year smoking history. She has never used smokeless tobacco.      Counseling Resources:  ATP IV Guidelines  Pooled Cohorts Equation Calculator  Breast Cancer Risk Calculator  BRCA-Related Cancer Risk Assessment: FHS-7 Tool  FRAX Risk Assessment  ICSI Preventive Guidelines  Dietary Guidelines for Americans, 2010  USDA's MyPlate  ASA Prophylaxis  Lung CA Screening    Erica Green MD  [facility]

## 2022-02-25 ENCOUNTER — HOSPITAL ENCOUNTER (OUTPATIENT)
Dept: MAMMOGRAPHY | Facility: CLINIC | Age: 57
Discharge: HOME | End: 2022-02-25
Attending: INTERNAL MEDICINE | Admitting: INTERNAL MEDICINE
Payer: COMMERCIAL

## 2022-02-25 DIAGNOSIS — Z00.00 ROUTINE HISTORY AND PHYSICAL EXAMINATION OF ADULT: ICD-10-CM

## 2022-02-25 LAB
BKR LAB AP GYN ADEQUACY: NORMAL
BKR LAB AP GYN INTERPRETATION: NORMAL
BKR LAB AP HPV REFLEX: NORMAL
BKR LAB AP PREVIOUS ABNORMAL: NORMAL
PATH REPORT.COMMENTS IMP SPEC: NORMAL
PATH REPORT.COMMENTS IMP SPEC: NORMAL
PATH REPORT.RELEVANT HX SPEC: NORMAL

## 2022-02-25 PROCEDURE — 77067 SCR MAMMO BI INCL CAD: CPT

## 2022-02-27 RX ORDER — ROSUVASTATIN CALCIUM 10 MG/1
10 TABLET, COATED ORAL DAILY
Qty: 90 TABLET | Refills: 0 | Status: SHIPPED | OUTPATIENT
Start: 2022-02-27 | End: 2023-10-24

## 2022-03-01 LAB
HUMAN PAPILLOMA VIRUS 16 DNA: NEGATIVE
HUMAN PAPILLOMA VIRUS 18 DNA: NEGATIVE
HUMAN PAPILLOMA VIRUS FINAL DIAGNOSIS: NORMAL
HUMAN PAPILLOMA VIRUS OTHER HR: NEGATIVE

## 2022-07-12 ENCOUNTER — OFFICE VISIT (OUTPATIENT)
Dept: URGENT CARE | Facility: URGENT CARE | Age: 57
End: 2022-07-12
Payer: COMMERCIAL

## 2022-07-12 VITALS
OXYGEN SATURATION: 98 % | TEMPERATURE: 98.9 F | SYSTOLIC BLOOD PRESSURE: 110 MMHG | HEART RATE: 74 BPM | DIASTOLIC BLOOD PRESSURE: 68 MMHG

## 2022-07-12 DIAGNOSIS — L25.9 CONTACT DERMATITIS AND ECZEMA: ICD-10-CM

## 2022-07-12 DIAGNOSIS — L30.1 ECZEMA, DYSHIDROTIC: Primary | ICD-10-CM

## 2022-07-12 PROCEDURE — 99213 OFFICE O/P EST LOW 20 MIN: CPT | Performed by: PHYSICIAN ASSISTANT

## 2022-07-12 RX ORDER — CETIRIZINE HYDROCHLORIDE 10 MG/1
10 TABLET ORAL DAILY
Qty: 30 TABLET | Refills: 0 | Status: SHIPPED | OUTPATIENT
Start: 2022-07-12 | End: 2023-10-24

## 2022-07-12 RX ORDER — METHYLPREDNISOLONE 4 MG
TABLET, DOSE PACK ORAL
Qty: 21 TABLET | Refills: 0 | Status: SHIPPED | OUTPATIENT
Start: 2022-07-12 | End: 2023-10-24

## 2022-07-12 RX ORDER — TRIAMCINOLONE ACETONIDE 5 MG/G
CREAM TOPICAL 2 TIMES DAILY
Qty: 45 G | Refills: 0 | Status: SHIPPED | OUTPATIENT
Start: 2022-07-12

## 2022-07-14 NOTE — PROGRESS NOTES
"  Assessment & Plan     Eczema, dyshidrotic    - cetirizine (ZYRTEC) 10 MG tablet; Take 1 tablet (10 mg) by mouth daily  - methylPREDNISolone (MEDROL DOSEPAK) 4 MG tablet therapy pack; Follow package instructions  - triamcinolone (ARISTOCORT HP) 0.5 % external cream; Apply topically 2 times daily  - Adult Dermatology Referral; Future    Contact dermatitis and eczema    Contact dermatitis is a skin rash caused by something that touches the skin and makes it irritated and inflamed. Your skin may be red, swollen, dry, and may be cracked. Blisters may form and ooze. The rash will itch.   Contact dermatitis often forms on the face and neck, backs of hands, forearms, genitals, and lower legs. But it can affect any area.   People can get contact dermatitis from lots of sources. These include:    Plants such as poison ivy, oak, or sumac    Chemicals in hair dyes and rinses, soaps, solvents, waxes, fingernail polish, and deodorants     Jewelry or watchbands made of nickel or cobalt  Contact dermatitis is not passed from person to person.  Talk with your healthcare provider about what may have caused the rash. A type of allergy testing called \"patch testing\" may be used to discover what you are allergic to. You will need to stay away from the source of the rash in the future to prevent it from coming back.   Treatment is done to ease itching and prevent the rash from coming back. The rash should go away in a few days to a few weeks.     Due to severity and duration of rash  Patient is being referred to Derm for further evaluation of rash    - cetirizine (ZYRTEC) 10 MG tablet; Take 1 tablet (10 mg) by mouth daily  - methylPREDNISolone (MEDROL DOSEPAK) 4 MG tablet therapy pack; Follow package instructions  - triamcinolone (ARISTOCORT HP) 0.5 % external cream; Apply topically 2 times daily  - Adult Dermatology Referral; Future    Review of external notes as documented elsewhere in note       CONSULTATION/REFERRAL to Derm    No " follow-ups on file.    Kristian Holguin, [facility], [place]ARDO  [facility] DOROTEO Randolph is a 56 year old, presenting for the following health issues:  Rash (1.5 month, did a lot of mulching,  all over body, itching, spreading)      TAYLOR Darling, 56 year old, female presents to the urgent care today with:   Rash (1.5 month, did a lot of mulching,  all over body, itching, spreading)      Review of Systems   Constitutional, HEENT, cardiovascular, pulmonary, gi and gu systems are negative, except as otherwise noted.      Objective    /68 (BP Location: Right arm, Patient Position: Sitting, Cuff Size: Adult Regular)   Pulse 74   Temp 98.9  F (37.2  C) (Tympanic)   LMP 07/31/2012   SpO2 98%   There is no height or weight on file to calculate BMI.  Physical Exam   GENERAL: healthy, alert and no distress  EYES: Eyes grossly normal to inspection, PERRL and conjunctivae and sclerae normal  HENT: ear canals and TM's normal, nose and mouth without ulcers or lesions  NECK: no adenopathy, no asymmetry, masses, or scars and thyroid normal to palpation  RESP: lungs clear to auscultation - no rales, rhonchi or wheezes  BREAST: normal without masses, tenderness or nipple discharge and no palpable axillary masses or adenopathy  CV: regular rate and rhythm, normal S1 S2, no S3 or S4, no murmur, click or rub, no peripheral edema and peripheral pulses strong  ABDOMEN: soft, nontender, no hepatosplenomegaly, no masses and bowel sounds normal  MS: no gross musculoskeletal defects noted, no edema  SKIN: Positive for macular rash, itching  NEURO: Normal strength and tone, mentation intact and speech normal  PSYCH: mentation appears normal, affect normal/bright                    .  ..

## 2022-07-19 ENCOUNTER — TRANSFERRED RECORDS (OUTPATIENT)
Dept: INTERNAL MEDICINE | Facility: CLINIC | Age: 57
End: 2022-07-19

## 2022-12-26 ENCOUNTER — HEALTH MAINTENANCE LETTER (OUTPATIENT)
Age: 57
End: 2022-12-26

## 2023-04-22 ENCOUNTER — HEALTH MAINTENANCE LETTER (OUTPATIENT)
Age: 58
End: 2023-04-22

## 2023-06-27 ENCOUNTER — OFFICE VISIT (OUTPATIENT)
Dept: URGENT CARE | Facility: URGENT CARE | Age: 58
End: 2023-06-27
Payer: COMMERCIAL

## 2023-06-27 ENCOUNTER — HOSPITAL ENCOUNTER (EMERGENCY)
Facility: CLINIC | Age: 58
Discharge: HOME | End: 2023-06-27
Attending: EMERGENCY MEDICINE | Admitting: EMERGENCY MEDICINE
Payer: COMMERCIAL

## 2023-06-27 VITALS
SYSTOLIC BLOOD PRESSURE: 130 MMHG | HEART RATE: 78 BPM | DIASTOLIC BLOOD PRESSURE: 75 MMHG | TEMPERATURE: 98 F | RESPIRATION RATE: 20 BRPM | OXYGEN SATURATION: 98 %

## 2023-06-27 VITALS
SYSTOLIC BLOOD PRESSURE: 139 MMHG | RESPIRATION RATE: 18 BRPM | HEART RATE: 70 BPM | OXYGEN SATURATION: 100 % | DIASTOLIC BLOOD PRESSURE: 72 MMHG | TEMPERATURE: 97.2 F

## 2023-06-27 DIAGNOSIS — S61.211A LACERATION OF LEFT INDEX FINGER WITHOUT FOREIGN BODY WITHOUT DAMAGE TO NAIL, INITIAL ENCOUNTER: ICD-10-CM

## 2023-06-27 PROCEDURE — 99283 EMERGENCY DEPT VISIT LOW MDM: CPT

## 2023-06-27 PROCEDURE — 12001 RPR S/N/AX/GEN/TRNK 2.5CM/<: CPT

## 2023-06-27 ASSESSMENT — ACTIVITIES OF DAILY LIVING (ADL): ADLS_ACUITY_SCORE: 33

## 2023-06-27 NOTE — ED PROVIDER NOTES
History     Chief Complaint:  Laceration       HPI   Tomasa Darling is a 57 year old female who presents with a flap laceration on the second digit of her left hand that was sustained earlier today when cutting parmesan cheese in her kitchen earlier today.      Independent Historian:   None - Patient Only    Review of External Notes:         Medications:    Zyrtec  Medrol dosepak  Halcion    Past Medical History:    Chronic rhinitis  hypercholesteremia    Past Surgical History:    Appendectomy     Physical Exam     Patient Vitals for the past 24 hrs:   BP Temp Pulse Resp SpO2   06/27/23 1053 139/72 -- -- -- --   06/27/23 1052 -- 97.2  F (36.2  C) 70 18 100 %        Physical Exam  General: Resting comfortably on the gurney  Head:  The scalp, face, and head appear normal  Eyes:  The pupils are normal    Conjunctivae and sclera appear normal  ENT:    The nose is normal    Ears/pinnae are normal  Neck:  Normal range of motion  MS:  The patient has a flap laceration on the second digit of the left hand that is curvilinear involving the radial aspect of the index finger just beyond the PIP joint and the apex of the flap laceration is angled proximally. Patient has normal tendon and nerve function.  Upon inspection of the wound after anesthesia, the flexor and extensor tendons are not seen in the base of the wound.  There is no digital artery or digital nerve injury.  Pinprick sensation to the distal finger radial aspect is normal.  There is some mild subcutaneous bleeding noted.  Skin:  Laceration as noted above.  Neuro: Speech is normal and fluent  Psych:  Awake. Alert.  Normal affect.      Appropriate interactions    Emergency Department Course       Procedures     Laceration Repair      Procedure: Laceration Repair    Indication: Laceration    Consent: Verbal    Location: Left Second digit    Length: 2 cm    Preparation: Irrigation with Sterile Saline.    Anesthesia/Sedation: Digital Block: A digital block was  performed with Bupivacaine - 0.5% on the affected digit.      Treatment/Exploration: Wound explored, no foreign bodies found     Closure: The wound was closed with 1 layer of 5-0 Ethilon sutures, simple interrupted    Patient Status: The patient tolerated the procedure well: Yes. There were no complications.      Emergency Department Course & Assessments:         Interventions:  Medications - No data to display     Assessments:  1231 I obtained history and examined the patient as noted above.  1249 I rechecked and updated the patient. I performed a flap laceration repair and deemed the patient safe to discharge to home.    Independent Interpretation (X-rays, CTs, rhythm strip):  None    Consultations/Discussion of Management or Tests:  None        Disposition:  The patient was discharged to home.     Impression & Plan    CMS Diagnoses: None          Medical Decision Making:  This patient presents to the emergency department with a laceration to the index finger as noted above.  This is a flap laceration, full-thickness, not down to tendon or bone or vascular or neurologic structures fortunately.  The laceration was closed with simple interrupted sutures as noted above, I used 5-0 Ethilon sutures, the wound was closed under loupes at 2.5X magnification with excellent wound edge eversion and hemostasis and alignment.  No neurovascular or tendinous injuries are noted.  There is no entry into the joint space.      Diagnosis:    ICD-10-CM    1. Laceration of left index finger without foreign body without damage to nail, initial encounter  S61.211A           Scribe Disclosure:  Edmund LUIS, am serving as a scribe at 12:12 PM on 6/27/2023 to document services personally performed by Christiano Kessler MD based on my observations and the provider's statements to me.        Christiano Kessler MD  06/27/23 3609

## 2023-06-27 NOTE — DISCHARGE INSTRUCTIONS
Discharge Instructions  Laceration (Cut)    You were seen today for a laceration (cut).  Your provider examined your laceration for any problems such a buried foreign body (like glass, a splinter, or gravel), or injury to blood vessels, tendons, and nerves.  Your provider may have also rinsed and/or scrubbed your laceration to help prevent an infection. It may not be possible to find all problems with your laceration on the first visit; occasionally foreign bodies or a tendon injury can go undetected.    Your laceration may have been closed in one of several ways:  No closure: many wounds will heal just fine without closure.  Stitches: regular stitches that require removal.  Staples: skin staples are often used in the scalp/head.  Wound adhesive (glue): skin glue can be used for certain lacerations and doesn t require removal.  Wound strips (aka Butterfly bandages or steri-strips): these are bandages that help to close a wound.  Absorbable stitches:  dissolving  stitches that go away on their own and usually don t require removal.    A small percentage of wounds will develop an infection regardless of how well the wound is cared for. Antibiotics are generally not indicated to prevent an infection so are only given for a small number of high-risk wounds. Some lacerations are too high risk to close, and are left open to heal because closure can increase the likelihood that an infection will develop.    Remember that all lacerations, no matter how expertly repaired, will cause scarring. We consider many factors, techniques, and materials, in our efforts to provide the best possible cosmetic outcome.    Generally, every Emergency Department visit should have a follow-up clinic visit with either a primary or a specialty clinic/provider. Please follow-up as instructed by your emergency provider today.     Return to the Emergency Department right away if:  You have more redness, swelling, pain, drainage (pus), a bad smell,  or red streaking from your laceration as these symptoms could indicate an infection.  You have a fever of 100.4 F or more.  You have bleeding that you cannot stop at home. If your cut starts to bleed, hold pressure on the bleeding area with a clean cloth or put pressure over the bandage.  If the bleeding does not stop after using constant pressure for 30 minutes, you should return to the Emergency Department for further treatment.  An area past the laceration is cool, pale, or blue compared with the other side, or has a slower return of color when squeezed.  Your dressing seems too tight or starts to get uncomfortable or painful. For children, signs of a problem might be irritability or restlessness.  You have loss of normal function or use of an area, such as being unable to straighten or bend a finger normally.  You have a numb area past the laceration.    Return to the Emergency Department or see your regular provider if:  The laceration starts to come open.   You have something coming out of the cut or a feeling that there is something in the laceration.  Your wound will not heal, or keeps breaking open. There can always be glass, wood, dirt or other things in any wound.  They will not always show up, even on x-rays.  If a wound does not heal, this may be why, and it is important to follow-up with your regular provider.    Home Care:  Take your dressing off in 12-24 hours, or as instructed by your provider, to check your laceration. Remove the dressing sooner if it seems too tight or painful, or if it is getting numb, tingly, or pale past the dressing.  Gently wash your laceration 1-2 times daily with clean water and mild soap. It is okay to shower or run clean water over the laceration, but do not let the laceration soak in water (no swimming).  If your laceration was closed with wound adhesive or strips: pat it dry and leave it open to the air. For all other repairs: after you wash your laceration, or at least  2 times a day, apply antibiotic ointment (such as Neosporin  or Bacitracin ) to the laceration, then cover it with a Band-Aid  or gauze.  Keep the laceration clean. Wear gloves or other protective clothing if you are around dirt.    Follow-up for removal:  If your wound was closed with staples or regular stitches, they need to be removed according to the instructions and timeline specified by your provider today.  If your wound was closed with absorbable ( dissolving ) sutures, they should fall out, dissolve, or not be visible in about one week. If they are still visible, then they should be removed according to the instructions and timeline specified by your provider today.    Scars:  To help minimize scarring:  Wear sunscreen over the healed laceration when out in the sun.  Massage the area regularly once healed.  You may apply Vitamin E to the healed wound.  Wait. Scars improve in appearance over months and years.    If you were given a prescription for medicine here today, be sure to read all of the information (including the package insert) that comes with your prescription.  This will include important information about the medicine, its side effects, and any warnings that you need to know about.  The pharmacist who fills the prescription can provide more information and answer questions you may have about the medicine.  If you have questions or concerns that the pharmacist cannot address, please call or return to the Emergency Department.       Remember that you can always come back to the Emergency Department if you are not able to see your regular provider in the amount of time listed above, if you get any new symptoms, or if there is anything that worries you.

## 2023-06-27 NOTE — ED TRIAGE NOTES
Patient presents with complaints of laceration to second finger on left hand from knife. Patient not UTD on Tetanus, bleeding controlled in triage with dressing. . ABC intact without need for intervention at this time.

## 2023-09-26 ENCOUNTER — HOSPITAL ENCOUNTER (OUTPATIENT)
Dept: MAMMOGRAPHY | Facility: CLINIC | Age: 58
Discharge: HOME | End: 2023-09-26
Attending: INTERNAL MEDICINE | Admitting: INTERNAL MEDICINE
Payer: COMMERCIAL

## 2023-09-26 DIAGNOSIS — Z12.31 VISIT FOR SCREENING MAMMOGRAM: ICD-10-CM

## 2023-09-26 PROCEDURE — 77067 SCR MAMMO BI INCL CAD: CPT

## 2023-10-18 ASSESSMENT — ENCOUNTER SYMPTOMS
FEVER: 0
SHORTNESS OF BREATH: 0
JOINT SWELLING: 0
CHILLS: 1
MYALGIAS: 1
DIZZINESS: 0
ABDOMINAL PAIN: 0
ARTHRALGIAS: 0
PARESTHESIAS: 1
HEADACHES: 0
HEMATURIA: 0
FREQUENCY: 0
DIARRHEA: 0
EYE PAIN: 0
NERVOUS/ANXIOUS: 1
HEARTBURN: 1
PALPITATIONS: 0
BREAST MASS: 0
SORE THROAT: 1
CONSTIPATION: 0
NAUSEA: 0
DYSURIA: 0
COUGH: 1
HEMATOCHEZIA: 0
WEAKNESS: 0

## 2023-10-24 ENCOUNTER — OFFICE VISIT (OUTPATIENT)
Dept: INTERNAL MEDICINE | Facility: CLINIC | Age: 58
End: 2023-10-24
Payer: COMMERCIAL

## 2023-10-24 VITALS
HEIGHT: 60 IN | SYSTOLIC BLOOD PRESSURE: 122 MMHG | HEART RATE: 79 BPM | WEIGHT: 140.4 LBS | TEMPERATURE: 97.2 F | RESPIRATION RATE: 15 BRPM | OXYGEN SATURATION: 99 % | DIASTOLIC BLOOD PRESSURE: 72 MMHG | BODY MASS INDEX: 27.56 KG/M2

## 2023-10-24 DIAGNOSIS — Z00.00 ROUTINE HISTORY AND PHYSICAL EXAMINATION OF ADULT: Primary | ICD-10-CM

## 2023-10-24 DIAGNOSIS — E78.00 HYPERCHOLESTEREMIA: ICD-10-CM

## 2023-10-24 DIAGNOSIS — L29.9 ITCHING: ICD-10-CM

## 2023-10-24 LAB
ALBUMIN SERPL BCG-MCNC: 4.7 G/DL (ref 3.5–5.2)
ALP SERPL-CCNC: 127 U/L (ref 35–104)
ALT SERPL W P-5'-P-CCNC: 33 U/L (ref 0–50)
ANION GAP SERPL CALCULATED.3IONS-SCNC: 10 MMOL/L (ref 7–15)
AST SERPL W P-5'-P-CCNC: 30 U/L (ref 0–45)
BILIRUB SERPL-MCNC: 0.4 MG/DL
BUN SERPL-MCNC: 15.2 MG/DL (ref 8–23)
CALCIUM SERPL-MCNC: 9.7 MG/DL (ref 8.8–10.2)
CHLORIDE SERPL-SCNC: 102 MMOL/L (ref 98–107)
CHOLEST SERPL-MCNC: 318 MG/DL
CREAT SERPL-MCNC: 0.86 MG/DL (ref 0.51–0.95)
DEPRECATED HCO3 PLAS-SCNC: 27 MMOL/L (ref 22–29)
EGFRCR SERPLBLD CKD-EPI 2021: 77 ML/MIN/1.73M2
ERYTHROCYTE [DISTWIDTH] IN BLOOD BY AUTOMATED COUNT: 13 % (ref 10–15)
GLUCOSE SERPL-MCNC: 94 MG/DL (ref 70–99)
HCT VFR BLD AUTO: 37.7 % (ref 35–47)
HDLC SERPL-MCNC: 50 MG/DL
HGB BLD-MCNC: 12.6 G/DL (ref 11.7–15.7)
LDLC SERPL CALC-MCNC: 232 MG/DL
MCH RBC QN AUTO: 28 PG (ref 26.5–33)
MCHC RBC AUTO-ENTMCNC: 33.4 G/DL (ref 31.5–36.5)
MCV RBC AUTO: 84 FL (ref 78–100)
NONHDLC SERPL-MCNC: 268 MG/DL
PLATELET # BLD AUTO: 315 10E3/UL (ref 150–450)
POTASSIUM SERPL-SCNC: 4.1 MMOL/L (ref 3.4–5.3)
PROT SERPL-MCNC: 7.9 G/DL (ref 6.4–8.3)
RBC # BLD AUTO: 4.5 10E6/UL (ref 3.8–5.2)
SODIUM SERPL-SCNC: 139 MMOL/L (ref 135–145)
TRIGL SERPL-MCNC: 182 MG/DL
TSH SERPL DL<=0.005 MIU/L-ACNC: 2.09 UIU/ML (ref 0.3–4.2)
WBC # BLD AUTO: 6.3 10E3/UL (ref 4–11)

## 2023-10-24 PROCEDURE — 82785 ASSAY OF IGE: CPT | Performed by: INTERNAL MEDICINE

## 2023-10-24 PROCEDURE — 86003 ALLG SPEC IGE CRUDE XTRC EA: CPT | Performed by: INTERNAL MEDICINE

## 2023-10-24 PROCEDURE — 84443 ASSAY THYROID STIM HORMONE: CPT | Performed by: INTERNAL MEDICINE

## 2023-10-24 PROCEDURE — 99213 OFFICE O/P EST LOW 20 MIN: CPT | Mod: 25 | Performed by: INTERNAL MEDICINE

## 2023-10-24 PROCEDURE — 80061 LIPID PANEL: CPT | Performed by: INTERNAL MEDICINE

## 2023-10-24 PROCEDURE — 99396 PREV VISIT EST AGE 40-64: CPT | Performed by: INTERNAL MEDICINE

## 2023-10-24 PROCEDURE — 80053 COMPREHEN METABOLIC PANEL: CPT | Performed by: INTERNAL MEDICINE

## 2023-10-24 PROCEDURE — 36415 COLL VENOUS BLD VENIPUNCTURE: CPT | Performed by: INTERNAL MEDICINE

## 2023-10-24 PROCEDURE — 85027 COMPLETE CBC AUTOMATED: CPT | Performed by: INTERNAL MEDICINE

## 2023-10-24 ASSESSMENT — ENCOUNTER SYMPTOMS
FREQUENCY: 0
WEAKNESS: 0
JOINT SWELLING: 0
DIZZINESS: 0
PALPITATIONS: 0
HEMATURIA: 0
FEVER: 0
DYSURIA: 0
EYE PAIN: 0
HEMATOCHEZIA: 0
HEADACHES: 0
CONSTIPATION: 0
ARTHRALGIAS: 0
DIARRHEA: 0
CONSTITUTIONAL NEGATIVE: 1
BREAST MASS: 0
SHORTNESS OF BREATH: 0
ABDOMINAL PAIN: 0
NAUSEA: 0

## 2023-10-24 NOTE — PROGRESS NOTES
SUBJECTIVE:   CC: Tomasa is an 58 year old who presents for preventive health visit.       10/24/2023     8:10 AM   Additional Questions   Roomed by alhaji   Accompanied by self         10/24/2023     8:10 AM   Patient Reported Additional Medications   Patient reports taking the following new medications none       Healthy Habits:     Getting at least 3 servings of Calcium per day:  Yes    Bi-annual eye exam:  Yes    Dental care twice a year:  Yes    Sleep apnea or symptoms of sleep apnea:  Daytime drowsiness    Diet:  Regular (no restrictions)    Frequency of exercise:  2-3 days/week    Duration of exercise:  15-30 minutes    Taking medications regularly:  Yes    Additional concerns today:  Yes      Today's PHQ-2 Score:       10/24/2023     8:10 AM   PHQ-2 ( 1999 [employer])   Q1: Little interest or pleasure in doing things 0   Q2: Feeling down, depressed or hopeless 0   PHQ-2 Score 0   Q1: Little interest or pleasure in doing things Not at all   Q2: Feeling down, depressed or hopeless Not at all   PHQ-2 Score 0          Hyperlipidemia Follow-Up    Are you regularly taking any medication or supplement to lower your cholesterol?   No  Are you having muscle aches or other side effects that you think could be caused by your cholesterol lowering medication?  NA    Pt c/o itching on michael arms and upper body since 2 yrs, has seen dermatologist and has been prescribed cortisone cream and prednisone without relief, patient has taken Zyrtec for 1 year without symptom relief.        Past Medical History:   Diagnosis Date    Chronic rhinitis     Hypercholesteremia        Past Surgical History:   Procedure Laterality Date    APPENDECTOMY         Current Outpatient Medications   Medication Sig Dispense Refill    Famotidine (PEPCID PO) Take by mouth daily      NASONEX 50 MCG/ACT NA SUSP INHALE 2 SPRAYS IN EACH NOSTRIL ONE DAILY 1 Month 0-MD visit needed    triamcinolone (ARISTOCORT HP) 0.5 % external cream Apply topically 2 times  daily (Patient not taking: Reported on 10/24/2023) 45 g 0       Family History   Problem Relation Age of Onset    Cancer Brother         bone and lung cancer    Heart Disease Father         4 heart attacks    C.A.D. Father         rhematic fever as a child;  of MI    Musculoskeletal Disorder Father         gout    Family History Negative Mother     C.A.D. Mother         aortic valve issue    Cerebrovascular Disease Mother     Unknown/Adopted Brother         suicide    Psychotic Disorder Brother         depression       Social History     Tobacco Use    Smoking status: Former     Packs/day: 0.50     Years: 20.00     Additional pack years: 0.00     Total pack years: 10.00     Types: Cigarettes     Quit date: 3/8/2009     Years since quittin.6    Smokeless tobacco: Never   Substance Use Topics    Alcohol use: No           10/18/2023    10:47 AM   Alcohol Use   Prescreen: >3 drinks/day or >7 drinks/week? No       Reviewed orders with patient.  Reviewed health maintenance and updated orders accordingly - Yes  Lab work is in process    Breast Cancer Screening:    FHS-7:       2022     1:27 PM 2023     8:49 AM   Breast CA Risk Assessment (FHS-7)   Did any of your first-degree relatives have breast or ovarian cancer? No No   Did any of your relatives have bilateral breast cancer? No No   Did any man in your family have breast cancer? No No   Did any woman in your family have breast and ovarian cancer? No No   Did any woman in your family have breast cancer before age 50 y? No No   Do you have 2 or more relatives with breast and/or ovarian cancer? No No   Do you have 2 or more relatives with breast and/or bowel cancer? No No       Pertinent mammograms are reviewed under the imaging tab.    History of abnormal Pap smear: NO - age 30-65 PAP every 5 years with negative HPV co-testing recommended      Latest Ref Rng & Units 2022     8:28 AM 2007    12:00 AM   PAP / HPV   PAP  Negative for  Intraepithelial Lesion or Malignancy (NILM)     PAP (Historical)   NIL    HPV 16 DNA Negative Negative     HPV 18 DNA Negative Negative     Other HR HPV Negative Negative       Reviewed and updated as needed this visit by clinical staff                  Reviewed and updated as needed this visit by Provider                     Review of Systems   Constitutional: Negative.  Negative for fever.   HENT:  Negative for ear pain and hearing loss.    Eyes:  Negative for pain and visual disturbance.   Respiratory:  Negative for shortness of breath.    Cardiovascular:  Negative for chest pain, palpitations and peripheral edema.   Gastrointestinal:  Negative for abdominal pain, constipation, diarrhea, hematochezia and nausea.   Breasts:  Negative for tenderness, breast mass and discharge.   Genitourinary:  Negative for dysuria, frequency, genital sores, hematuria, pelvic pain, urgency, vaginal bleeding and vaginal discharge.   Musculoskeletal:  Negative for arthralgias and joint swelling.   Skin:  Positive for rash.   Neurological:  Negative for dizziness, weakness and headaches.   Psychiatric/Behavioral:  Negative for mood changes.       OBJECTIVE:   /72   Pulse 79   Temp 97.2  F (36.2  C) (Tympanic)   Resp 15   Ht 1.524 m (5')   Wt 63.7 kg (140 lb 6.4 oz)   LMP 07/31/2012   SpO2 99%   BMI 27.42 kg/m    Physical Exam  GENERAL APPEARANCE: healthy, alert and no distress  EYES: Eyes grossly normal to inspection, PERRL and conjunctivae and sclerae normal  HENT: ear canals and TM's normal, nose and mouth without ulcers or lesions, oropharynx clear and oral mucous membranes moist  RESP: lungs clear to auscultation - no rales, rhonchi or wheezes  BREAST: normal without masses, tenderness or nipple discharge and no palpable axillary masses or adenopathy  CV: regular rate and rhythm, normal S1 S2, no S3 or S4, no murmur, click or rub, no peripheral edema and peripheral pulses strong  ABDOMEN: soft, nontender, no  hepatosplenomegaly, no masses and bowel sounds normal  MS: no musculoskeletal defects are noted and gait is age appropriate without ataxia  SKIN: no suspicious lesions or rashes  NEURO: Normal strength and tone, sensory exam grossly normal, mentation intact and speech normal  PSYCH: mentation appears normal and affect normal/bright      ASSESSMENT/PLAN:       (Z00.00) Routine history and physical examination of adult  (primary encounter diagnosis)  Plan: CBC with platelets, TSH with free T4 reflex,         Lipid panel reflex to direct LDL Fasting,         Comprehensive metabolic panel            (E78.00) Hypercholesteremia  Plan: Patient was started on Crestor at last office visit but patient did not fill the medication, check lipid panel today.pt was told I will contact her after results and proceed accordingly.      (L29.9) Itching  Plan: TSH with free T4 reflex, Comprehensive metabolic panel, Adult Allergy/Asthma         Referral            Patient has been advised of split billing requirements and indicates understanding: Yes      COUNSELING:  Reviewed preventive health counseling, as reflected in patient instructions       Regular exercise       Healthy diet/nutrition        She reports that she quit smoking about 14 years ago. Her smoking use included cigarettes. She has a 10.00 pack-year smoking history. She has never used smokeless tobacco.          Erica Green MD  [facility]

## 2023-10-24 NOTE — NURSING NOTE
/72   Pulse 79   Temp 97.2  F (36.2  C) (Tympanic)   Resp 15   Ht 1.524 m (5')   Wt 63.7 kg (140 lb 6.4 oz)   LMP 07/31/2012   SpO2 99%   BMI 27.42 kg/m

## 2023-10-26 ENCOUNTER — OFFICE VISIT (OUTPATIENT)
Dept: ALLERGY | Facility: CLINIC | Age: 58
End: 2023-10-26
Attending: INTERNAL MEDICINE
Payer: COMMERCIAL

## 2023-10-26 VITALS
BODY MASS INDEX: 27.93 KG/M2 | WEIGHT: 143 LBS | OXYGEN SATURATION: 96 % | SYSTOLIC BLOOD PRESSURE: 108 MMHG | RESPIRATION RATE: 18 BRPM | DIASTOLIC BLOOD PRESSURE: 73 MMHG | HEART RATE: 67 BPM

## 2023-10-26 DIAGNOSIS — L29.9 ITCHING: ICD-10-CM

## 2023-10-26 LAB
D FARINAE IGE QN: <0.1 KU(A)/L
D PTERONYSS IGE QN: <0.1 KU(A)/L
DOG DANDER+EPITH IGE QN: <0.1 KU(A)/L
IGE SERPL-ACNC: <2 KU/L (ref 0–114)

## 2023-10-26 PROCEDURE — 99203 OFFICE O/P NEW LOW 30 MIN: CPT | Performed by: INTERNAL MEDICINE

## 2023-10-26 ASSESSMENT — ENCOUNTER SYMPTOMS
JOINT SWELLING: 0
HEADACHES: 0
ACTIVITY CHANGE: 0
CHEST TIGHTNESS: 0
WHEEZING: 0
DIARRHEA: 0
VOMITING: 0
FACIAL SWELLING: 0
ARTHRALGIAS: 0
ADENOPATHY: 0
RHINORRHEA: 0
EYE REDNESS: 0
SHORTNESS OF BREATH: 0
CHILLS: 0
NAUSEA: 0
SINUS PRESSURE: 1
FEVER: 0
COUGH: 0
MYALGIAS: 0
EYE ITCHING: 0
EYE DISCHARGE: 0
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
## 2023-10-29 RX ORDER — ROSUVASTATIN CALCIUM 10 MG/1
10 TABLET, COATED ORAL DAILY
Qty: 90 TABLET | Refills: 0 | Status: SHIPPED | OUTPATIENT
Start: 2023-10-29 | End: 2024-01-24

## 2023-11-28 ENCOUNTER — OFFICE VISIT (OUTPATIENT)
Dept: ALLERGY | Facility: CLINIC | Age: 58
End: 2023-11-28
Attending: INTERNAL MEDICINE
Payer: COMMERCIAL

## 2023-11-28 VITALS
DIASTOLIC BLOOD PRESSURE: 72 MMHG | OXYGEN SATURATION: 100 % | SYSTOLIC BLOOD PRESSURE: 110 MMHG | BODY MASS INDEX: 27.94 KG/M2 | WEIGHT: 143.08 LBS | HEART RATE: 84 BPM

## 2023-11-28 DIAGNOSIS — L29.9 ITCHING: ICD-10-CM

## 2023-11-28 PROCEDURE — 99214 OFFICE O/P EST MOD 30 MIN: CPT | Performed by: INTERNAL MEDICINE

## 2023-11-28 RX ORDER — GABAPENTIN 300 MG/1
CAPSULE ORAL
Qty: 30 CAPSULE | Refills: 3 | Status: SHIPPED | OUTPATIENT
Start: 2023-11-28 | End: 2024-01-02

## 2023-11-28 NOTE — PROGRESS NOTES
Tomasa Darling was seen in the Allergy Clinic at [facility].    Tomasa Darling is a 58 year old female being seen today for ongoing evaluation of Itching    Since the last visit the patient has been having severe itching of her skin.    The itching started 2 years ago.  She [place] and then developed a rash that lasted for 3 months.  The rash eventually resolved but the itching persists.  She saw dermatology and she has been treated with Medrol, cortisone and Zyrtec.  Does not get any significant benefit except for the oral steroids provided partial benefit.    Recent lab work includes a complete metabolic panel, TSH, dust mite and dog IgE levels as well as CBC which were normal.  She did have mild elevation in alkaline phosphatase.    Her skin feels hot.  She does not have an obvious rash.      The itching is every day and all year-round.  The itching does not change depending on if she is home of if traveling.  The itch does worsen at night and it does awaken her at night.  She feels like she is only sleeping 3 to 4 hours a night due to the itching.      The itching is primarily of the arms, breast, back, and upper chest and stomach.    At the last appointment recommended to take Zyrtec 20 mg at night which did not provide any benefit.    Past Medical History:   Diagnosis Date    Chronic rhinitis     Hypercholesteremia      Family History   Problem Relation Age of Onset    Cancer Brother         bone and lung cancer    Heart Disease Father         4 heart attacks    C.A.D. Father         rhematic fever as a child;  of MI    Musculoskeletal Disorder Father         gout    Family History Negative Mother     C.A.D. Mother         aortic valve issue    Cerebrovascular Disease Mother     Unknown/Adopted Brother         suicide    Psychotic Disorder Brother         depression     Past Surgical History:   Procedure Laterality Date    APPENDECTOMY            Current Outpatient Medications:     Famotidine (PEPCID PO), Take by mouth daily, Disp: , Rfl:     gabapentin (NEURONTIN) 300 MG capsule, Take 1 cap at night, may increase dose up to three times daily if needed., Disp: 30 capsule, Rfl: 3    NASONEX 50 MCG/ACT NA SUSP, INHALE 2 SPRAYS IN EACH NOSTRIL ONE DAILY, Disp: 1 Month, Rfl: 0-MD visit needed    rosuvastatin (CRESTOR) 10 MG tablet, Take 1 tablet (10 mg) by mouth daily, Disp: 90 tablet, Rfl: 0    triamcinolone (ARISTOCORT HP) 0.5 % external cream, Apply topically 2 times daily (Patient not taking: Reported on 10/24/2023), Disp: 45 g, Rfl: 0  Allergies   Allergen Reactions    Clindamycin          EXAM:   /72   Pulse 84   Wt 64.9 kg (143 lb 1.3 oz)   LMP 07/31/2012   SpO2 100%   BMI 27.94 kg/m      Constitutional:       General: She is not in acute distress.     Appearance: Normal appearance. She is not ill-appearing.   HENT:      Head: Normocephalic and atraumatic.   Eyes:      General:         Right eye: No discharge.         Left eye: No discharge.   Psychiatric:         Mood and Affect: Mood normal.         Behavior: Behavior normal.       Latest Reference Range & Units 10/24/23 08:44   Sodium 135 - 145 mmol/L 139   Potassium 3.4 - 5.3 mmol/L 4.1   Chloride 98 - 107 mmol/L 102   Carbon Dioxide (CO2) 22 - 29 mmol/L 27   Urea Nitrogen 8.0 - 23.0 mg/dL 15.2   Creatinine 0.51 - 0.95 mg/dL 0.86   GFR Estimate >60 mL/min/1.73m2 77   Calcium 8.8 - 10.2 mg/dL 9.7   Anion Gap 7 - 15 mmol/L 10   Albumin 3.5 - 5.2 g/dL 4.7   Protein Total 6.4 - 8.3 g/dL 7.9   Alkaline Phosphatase 35 - 104 U/L 127 (H)   ALT 0 - 50 U/L 33   AST 0 - 45 U/L 30   Bilirubin Total <=1.2 mg/dL 0.4   Cholesterol <200 mg/dL 318 (H)   Glucose 70 - 99 mg/dL 94   HDL Cholesterol >=50 mg/dL 50   IGE 0 - 114 kU/L <2   LDL Cholesterol Calculated <=100 mg/dL 232 (H)   Non HDL Cholesterol <130 mg/dL 268 (H)   Triglycerides <150 mg/dL 182 (H)   TSH 0.30 - 4.20 uIU/mL 2.09   Allergen D  farinae <0.10 KU(A)/L <0.10   Allergen, D Pteronyssinus <0.10 KU(A)/L <0.10   Allergen Dog Dander <0.10 KU(A)/L <0.10   WBC 4.0 - 11.0 10e3/uL 6.3   Hemoglobin 11.7 - 15.7 g/dL 12.6   Hematocrit 35.0 - 47.0 % 37.7   Platelet Count 150 - 450 10e3/uL 315   RBC Count 3.80 - 5.20 10e6/uL 4.50   MCV 78 - 100 fL 84   MCH 26.5 - 33.0 pg 28.0   MCHC 31.5 - 36.5 g/dL 33.4   RDW 10.0 - 15.0 % 13.0   (H): Data is abnormally high    ASSESSMENT/PLAN:  Tomasa Darling is a 58 year old female seen today for persistent itching without an obvious rash.  Will treat for possible neuropathic itch with gabapentin at night.  She is concerned about side effects and will start with nighttime dosing only.  May add additional dosing of the itching is persistent throughout the day.    Start Gabapentin 300 mg at night, may consider increasing the dose up to 3 times daily if needed.    Follow-up in 1 month      Thank you for allowing me to participate in the care of Tomasa Darling.      I spent 30 minutes on the date of the encounter doing chart review, history and exam, documentation and further coordination as noted above exclusive of separately reported interpretations    Asher Arita MD  Allergy/Immunology  [facility]

## 2023-11-28 NOTE — LETTER
2023         RE: Tomasa Darling  [address] [facility] [address]        Dear Colleague,    Thank you for referring your patient, Tomasa Darling, to the [facility] SPECIALTY [facility]. Please see a copy of my visit note below.    Tomasa Darling was seen in the Allergy Clinic at [facility].    Tomasa Darling is a 58 year old female being seen today for ongoing evaluation of Itching    Since the last visit the patient has been having severe itching of her skin.    The itching started 2 years ago.  She [place] and then developed a rash that lasted for 3 months.  The rash eventually resolved but the itching persists.  She saw dermatology and she has been treated with Medrol, cortisone and Zyrtec.  Does not get any significant benefit except for the oral steroids provided partial benefit.    Recent lab work includes a complete metabolic panel, TSH, dust mite and dog IgE levels as well as CBC which were normal.  She did have mild elevation in alkaline phosphatase.    Her skin feels hot.  She does not have an obvious rash.      The itching is every day and all year-round.  The itching does not change depending on if she is home of if traveling.  The itch does worsen at night and it does awaken her at night.  She feels like she is only sleeping 3 to 4 hours a night due to the itching.      The itching is primarily of the arms, breast, back, and upper chest and stomach.    At the last appointment recommended to take Zyrtec 20 mg at night which did not provide any benefit.    Past Medical History:   Diagnosis Date     Chronic rhinitis      Hypercholesteremia      Family History   Problem Relation Age of Onset     Cancer Brother         bone and lung cancer     Heart Disease Father         4 heart attacks     C.A.D. Father         rhematic fever as a child;  of MI     Musculoskeletal Disorder Father         gout     Family  History Negative Mother      C.A.D. Mother         aortic valve issue     Cerebrovascular Disease Mother      Unknown/Adopted Brother         suicide     Psychotic Disorder Brother         depression     Past Surgical History:   Procedure Laterality Date     APPENDECTOMY           Current Outpatient Medications:      Famotidine (PEPCID PO), Take by mouth daily, Disp: , Rfl:      gabapentin (NEURONTIN) 300 MG capsule, Take 1 cap at night, may increase dose up to three times daily if needed., Disp: 30 capsule, Rfl: 3     NASONEX 50 MCG/ACT NA SUSP, INHALE 2 SPRAYS IN EACH NOSTRIL ONE DAILY, Disp: 1 Month, Rfl: 0-MD visit needed     rosuvastatin (CRESTOR) 10 MG tablet, Take 1 tablet (10 mg) by mouth daily, Disp: 90 tablet, Rfl: 0     triamcinolone (ARISTOCORT HP) 0.5 % external cream, Apply topically 2 times daily (Patient not taking: Reported on 10/24/2023), Disp: 45 g, Rfl: 0  Allergies   Allergen Reactions     Clindamycin          EXAM:   /72   Pulse 84   Wt 64.9 kg (143 lb 1.3 oz)   LMP 07/31/2012   SpO2 100%   BMI 27.94 kg/m      Constitutional:       General: She is not in acute distress.     Appearance: Normal appearance. She is not ill-appearing.   HENT:      Head: Normocephalic and atraumatic.   Eyes:      General:         Right eye: No discharge.         Left eye: No discharge.   Psychiatric:         Mood and Affect: Mood normal.         Behavior: Behavior normal.       Latest Reference Range & Units 10/24/23 08:44   Sodium 135 - 145 mmol/L 139   Potassium 3.4 - 5.3 mmol/L 4.1   Chloride 98 - 107 mmol/L 102   Carbon Dioxide (CO2) 22 - 29 mmol/L 27   Urea Nitrogen 8.0 - 23.0 mg/dL 15.2   Creatinine 0.51 - 0.95 mg/dL 0.86   GFR Estimate >60 mL/min/1.73m2 77   Calcium 8.8 - 10.2 mg/dL 9.7   Anion Gap 7 - 15 mmol/L 10   Albumin 3.5 - 5.2 g/dL 4.7   Protein Total 6.4 - 8.3 g/dL 7.9   Alkaline Phosphatase 35 - 104 U/L 127 (H)   ALT 0 - 50 U/L 33   AST 0 - 45 U/L 30   Bilirubin Total <=1.2 mg/dL 0.4    Cholesterol <200 mg/dL 318 (H)   Glucose 70 - 99 mg/dL 94   HDL Cholesterol >=50 mg/dL 50   IGE 0 - 114 kU/L <2   LDL Cholesterol Calculated <=100 mg/dL 232 (H)   Non HDL Cholesterol <130 mg/dL 268 (H)   Triglycerides <150 mg/dL 182 (H)   TSH 0.30 - 4.20 uIU/mL 2.09   Allergen D farinae <0.10 KU(A)/L <0.10   Allergen, D Pteronyssinus <0.10 KU(A)/L <0.10   Allergen Dog Dander <0.10 KU(A)/L <0.10   WBC 4.0 - 11.0 10e3/uL 6.3   Hemoglobin 11.7 - 15.7 g/dL 12.6   Hematocrit 35.0 - 47.0 % 37.7   Platelet Count 150 - 450 10e3/uL 315   RBC Count 3.80 - 5.20 10e6/uL 4.50   MCV 78 - 100 fL 84   MCH 26.5 - 33.0 pg 28.0   MCHC 31.5 - 36.5 g/dL 33.4   RDW 10.0 - 15.0 % 13.0   (H): Data is abnormally high    ASSESSMENT/PLAN:  Tomasa Darling is a 58 year old female seen today for persistent itching without an obvious rash.  Will treat for possible neuropathic itch with gabapentin at night.  She is concerned about side effects and will start with nighttime dosing only.  May add additional dosing of the itching is persistent throughout the day.    Start Gabapentin 300 mg at night, may consider increasing the dose up to 3 times daily if needed.    Follow-up in 1 month      Thank you for allowing me to participate in the care of Tomasa Darling.      I spent 30 minutes on the date of the encounter doing chart review, history and exam, documentation and further coordination as noted above exclusive of separately reported interpretations    Asher Arita MD  Allergy/Immunology  [facility]      Again, thank you for allowing me to participate in the care of your patient.        Sincerely,        Asher Arita MD

## 2023-11-28 NOTE — PATIENT INSTRUCTIONS
Start Gabapentin 300 mg at night, may consider increasing the dose up to 3 times daily if needed.        Allergy Staff Appt Hours Shot Hours Location       Physician   Asher Arita MD      Support Staff   VIVEK Buckner, AZEEM Araiza MA      Mondays Tuesdays Thursdays and Fridays:      Sol 7-5 Wednesdays         Close                Mondays, Tuesdays and Fridays:  7:20 - 3:40              [facility]  [address] [facility] [address] [facility] [address]  [place] [address]  Allergy appointment  line: [phone]    Pulmonary Function Scheduling:  [place]: [phone]

## 2024-01-02 ENCOUNTER — VIRTUAL VISIT (OUTPATIENT)
Dept: ALLERGY | Facility: CLINIC | Age: 59
End: 2024-01-02
Payer: COMMERCIAL

## 2024-01-02 DIAGNOSIS — L29.9 ITCHING: ICD-10-CM

## 2024-01-02 PROCEDURE — 99213 OFFICE O/P EST LOW 20 MIN: CPT | Mod: 95 | Performed by: INTERNAL MEDICINE

## 2024-01-02 RX ORDER — GABAPENTIN 300 MG/1
600 CAPSULE ORAL AT BEDTIME
Qty: 90 CAPSULE | Refills: 3 | Status: SHIPPED | OUTPATIENT
Start: 2024-01-02 | End: 2024-05-23

## 2024-01-02 NOTE — PROGRESS NOTES
Virtual Visit Details    Type of service:  Video Visit     Originating Location (pt. Location): Home    Distant Location (provider location):  Off-site  Platform used for Video Visit: Pawel    Tomasa Darling was seen virtually.    Tomasa Darling is a 58 year old female being seen for ongoing evaluation of Itching.    Since the last visit the patient has been getting better.  Zyrtec did not provide benefit in the past.  She was given a prescription for gabapentin at the last appointment which has resulted in about a 50% improvement.  She is getting additional sleep now.  She does sleep for about 6 hours.  Unfortunately she wakes up and will have to take another gabapentin.  She is wondering how many she can take in 1 day.  She is not noticing any significant side effects.  She is happy with the improvement but is wondering about the best dosing regimen.    PAST ALLERGY HISTORY:     The itching started 2 years ago.  She [place] and then developed a rash that lasted for 3 months.  The rash eventually resolved but the itching persists.  She saw dermatology and she has been treated with Medrol, cortisone and Zyrtec.  Only oral steroids provided partial benefit.     Complete metabolic panel, TSH, dust mite and dog IgE levels as well as CBC which were normal.  She did have mild elevation in alkaline phosphatase.     Her skin feels hot to her.  She does not have an obvious rash.       The itching is every day and all year-round.  The itching does not change depending on if she is home of if traveling.  The itch does worsen at night and it does awaken her at night.  She feels like she is only sleeping 3 to 4 hours a night due to the itching.       The itching is primarily of the arms, breast, back, and upper chest and stomach.         Past Medical History:   Diagnosis Date    Chronic rhinitis     Hypercholesteremia      Family History   Problem Relation Age of Onset     Cancer Brother         bone and lung cancer    Heart Disease Father         4 heart attacks    C.A.D. Father         rhematic fever as a child;  of MI    Musculoskeletal Disorder Father         gout    Family History Negative Mother     C.A.D. Mother         aortic valve issue    Cerebrovascular Disease Mother     Unknown/Adopted Brother         suicide    Psychotic Disorder Brother         depression     Past Surgical History:   Procedure Laterality Date    APPENDECTOMY           Current Outpatient Medications:     Famotidine (PEPCID PO), Take by mouth daily, Disp: , Rfl:     gabapentin (NEURONTIN) 300 MG capsule, Take 1 cap at night, may increase dose up to three times daily if needed., Disp: 30 capsule, Rfl: 3    NASONEX 50 MCG/ACT NA SUSP, INHALE 2 SPRAYS IN EACH NOSTRIL ONE DAILY, Disp: 1 Month, Rfl: 0-MD visit needed    rosuvastatin (CRESTOR) 10 MG tablet, Take 1 tablet (10 mg) by mouth daily, Disp: 90 tablet, Rfl: 0    triamcinolone (ARISTOCORT HP) 0.5 % external cream, Apply topically 2 times daily (Patient not taking: Reported on 10/24/2023), Disp: 45 g, Rfl: 0  Allergies   Allergen Reactions    Clindamycin          EXAM:   LMP 2012       ASSESSMENT/PLAN:  Tomasa Darling is a 58 year old female seen today for ongoing evaluation of itching without a rash.  Gabapentin is providing benefit.     Will increase the gabapentin to 600 mg at night.  Full dose for itching would be 600 mg 3 times a day.  Will start with nighttime dosing only at this higher amount and hopefully this will provide some additional benefit.  If necessary she could try 600 mg at night and then 300 mg once or twice throughout the day.  Will try to go with the lowest dose possible to minimize side effects.    Follow-up in 1 month      Thank you for allowing me to participate in the care of Tomasa Darling.      I spent 25 minutes on the date of the encounter doing chart review, history and exam, documentation and further  coordination as noted above exclusive of separately reported interpretations    Asher Arita MD  Allergy/Immunology  [facility]

## 2024-01-02 NOTE — LETTER
1/2/2024         RE: Tomasa Darling  [address] [facility] [address]        Dear Colleague,    Thank you for referring your patient, Tomasa Darling, to the [facility] SPECIALTY [facility]. Please see a copy of my visit note below.    Virtual Visit Details    Type of service:  Video Visit     Originating Location (pt. Location): Home    Distant Location (provider location):  Off-site  Platform used for Video Visit: Well    Tomasa Darling was seen virtually.    Tomasa Darling is a 58 year old female being seen for ongoing evaluation of Itching.    Since the last visit the patient has been getting better.  Zyrtec did not provide benefit in the past.  She was given a prescription for gabapentin at the last appointment which has resulted in about a 50% improvement.  She is getting additional sleep now.  She does sleep for about 6 hours.  Unfortunately she wakes up and will have to take another gabapentin.  She is wondering how many she can take in 1 day.  She is not noticing any significant side effects.  She is happy with the improvement but is wondering about the best dosing regimen.    PAST ALLERGY HISTORY:     The itching started 2 years ago.  She [place] and then developed a rash that lasted for 3 months.  The rash eventually resolved but the itching persists.  She saw dermatology and she has been treated with Medrol, cortisone and Zyrtec.  Only oral steroids provided partial benefit.     Complete metabolic panel, TSH, dust mite and dog IgE levels as well as CBC which were normal.  She did have mild elevation in alkaline phosphatase.     Her skin feels hot to her.  She does not have an obvious rash.       The itching is every day and all year-round.  The itching does not change depending on if she is home of if traveling.  The itch does worsen at night and it does awaken her at night.  She feels like she is only sleeping 3 to 4 hours a  night due to the itching.       The itching is primarily of the arms, breast, back, and upper chest and stomach.         Past Medical History:   Diagnosis Date     Chronic rhinitis      Hypercholesteremia      Family History   Problem Relation Age of Onset     Cancer Brother         bone and lung cancer     Heart Disease Father         4 heart attacks     C.A.D. Father         rhematic fever as a child;  of MI     Musculoskeletal Disorder Father         gout     Family History Negative Mother      C.A.D. Mother         aortic valve issue     Cerebrovascular Disease Mother      Unknown/Adopted Brother         suicide     Psychotic Disorder Brother         depression     Past Surgical History:   Procedure Laterality Date     APPENDECTOMY           Current Outpatient Medications:      Famotidine (PEPCID PO), Take by mouth daily, Disp: , Rfl:      gabapentin (NEURONTIN) 300 MG capsule, Take 1 cap at night, may increase dose up to three times daily if needed., Disp: 30 capsule, Rfl: 3     NASONEX 50 MCG/ACT NA SUSP, INHALE 2 SPRAYS IN EACH NOSTRIL ONE DAILY, Disp: 1 Month, Rfl: 0-MD visit needed     rosuvastatin (CRESTOR) 10 MG tablet, Take 1 tablet (10 mg) by mouth daily, Disp: 90 tablet, Rfl: 0     triamcinolone (ARISTOCORT HP) 0.5 % external cream, Apply topically 2 times daily (Patient not taking: Reported on 10/24/2023), Disp: 45 g, Rfl: 0  Allergies   Allergen Reactions     Clindamycin          EXAM:   LMP 2012       ASSESSMENT/PLAN:  Tomasa Darling is a 58 year old female seen today for ongoing evaluation of itching without a rash.  Gabapentin is providing benefit.     Will increase the gabapentin to 600 mg at night.  Full dose for itching would be 600 mg 3 times a day.  Will start with nighttime dosing only at this higher amount and hopefully this will provide some additional benefit.  If necessary she could try 600 mg at night and then 300 mg once or twice throughout the day.  Will try to go with the  lowest dose possible to minimize side effects.    Follow-up in 1 month      Thank you for allowing me to participate in the care of Tomasa Darling.      I spent 25 minutes on the date of the encounter doing chart review, history and exam, documentation and further coordination as noted above exclusive of separately reported interpretations    Asher Arita MD  Allergy/Immunology  [facility]       Again, thank you for allowing me to participate in the care of your patient.        Sincerely,        Asher Arita MD

## 2024-01-02 NOTE — PATIENT INSTRUCTIONS
Will increase the gabapentin to 600 mg at night.  Full dose for itching would be 600 mg 3 times a day.  Will start with nighttime dosing only at this higher amount and hopefully this will provide some additional benefit.  If necessary she could try 600 mg at night and then 300 mg once or twice throughout the day.  Will try to go with the lowest dose possible to minimize side effects.      Allergy Staff Appt Hours Shot Hours Location       Physician   Asher Arita MD      Support Staff   VIEVK Buckner, AZEEM Araiza MA      Mondays Tuesdays Thursdays and Fridays:      Sol 7-5 Wednesdays         Close                Mondays, Tuesdays and Fridays:  7:20 - 3:40              [facility]  [address]  [place] [address]  Allergy appointment  line: [phone]    Pulmonary Function Scheduling:  [place]: [phone]           Questions about cost of your care  For questions about your cost of your visit, procedure, lab or imaging contact: [employer] Price Line [phone] or visit:  [email or web address]    Prescription Assistance  If you need assistance with your prescriptions (cost, coverage, etc) please contact: [employer] Prescription Assistance Program [phone]    Important Scheduling Information  All visits for food challenges, medication/drug allergy testing, and drug challenges MUST be scheduled through the allergy clinic nurse. Please contact them via [employer] or by calling the clinic at [phone] and asking to speak with an allergy nurse. They will provide additional information and instructions for the appointment. Discontinue oral antihistamines 7 days prior to the appointment. Discontinue nasal and ocular antihistamines 1 day prior to the appointment.    Appointments for skin testing: Appointment will last approximately 45 minutes.  Please call the appointment line for your clinic to  schedule.  Discontinue oral antihistamines 7 days prior to the appointment.  Discontinue nasal and ocular antihistamines 1 days prior to appointment.    Thank you for trusting us with your care. Please feel free to contact us with any questions or concerns you may have.

## 2024-01-12 ENCOUNTER — TELEPHONE (OUTPATIENT)
Dept: GASTROENTEROLOGY | Facility: CLINIC | Age: 59
End: 2024-01-12
Payer: COMMERCIAL

## 2024-01-12 NOTE — TELEPHONE ENCOUNTER
"Endoscopy Scheduling Screen    Have you had a positive Covid test in the last 14 days?  No      Are you active on MyChart?   Yes      What insurance is in the chart?  Other:  [facility]    Ordering/Referring Provider: YESENIA GARVIN   (If ordering provider performs procedure, schedule with ordering provider unless otherwise instructed. )    BMI: Estimated body mass index is 27.94 kg/m  as calculated from the following:    Height as of 10/24/23: 1.524 m (5').    Weight as of 11/28/23: 64.9 kg (143 lb 1.3 oz).     Sedation Ordered  moderate sedation.   If patient BMI > 50 do not schedule in [facility].    If patient BMI > 45 do not schedule at [facility].    Are you taking methadone or Suboxone?  No      Are you taking any prescription medications for pain 3 or more times per week?   NO - No RN review required.      Do you have a history of malignant hyperthermia or adverse reaction to anesthesia?  No       (Females) Are you currently pregnant?          Have you been diagnosed or told you have pulmonary hypertension?   No      Do you have an LVAD?  No      Have you been told you have moderate to severe sleep apnea?  No      Have you been told you have COPD, asthma, or any other lung disease?  No      Do you have any heart conditions?  No       Have you ever had an organ transplant?   No      Have you ever had or are you awaiting a heart or lung transplant?   No      Have you had a stroke or transient ischemic attack (TIA aka \"mini stroke\" in the last 6 months?   No      Have you been diagnosed with or been told you have cirrhosis of the liver?   No      Are you currently on dialysis?   No      Do you need assistance transferring?   No    BMI: Estimated body mass index is 27.94 kg/m  as calculated from the following:    Height as of 10/24/23: 1.524 m (5').    Weight as of 11/28/23: 64.9 kg (143 lb 1.3 oz).     Is patients BMI > 40 and scheduling location [facility]?  No      Do you take an injectable medication for weight loss or " diabetes (excluding insulin)?  No      Do you take the medication Naltrexone?  No      Do you take blood thinners?  No       Prep   Are you currently on dialysis or do you have chronic kidney disease?  No      Do you have a diagnosis of diabetes?  No      Do you have a diagnosis of cystic fibrosis (CF)?  No      On a regular basis do you go 3 -5 days between bowel movements?  No      BMI > 40?  No    Preferred Pharmacy:    [facility] [id number] [place] [facility] [address] [facility]  [address] [facility] [address]  Phone: [phone] Fax: [phone]      Final Scheduling Details   Colonoscopy prep sent?  Standard MiraLAX    Procedure scheduled  Colonoscopy    Surgeon:  SHERI     Date of procedure:  6/21/24     Pre-OP / PAC:   No - Not required for this site.    Location  [facility] - Patient preference.    Sedation   Moderate Sedation - Per order.      Patient Reminders:   You will receive a call from a Nurse to review instructions and health history.  This assessment must be completed prior to your procedure.  Failure to complete the Nurse assessment may result in the procedure being cancelled.      On the day of your procedure, please designate an adult(s) who can drive you home stay with you for the next 24 hours. The medicines used in the exam will make you sleepy. You will not be able to drive.      You cannot take public transportation, ride share services, or non-medical taxi service without a responsible caregiver.  Medical transport services are allowed with the requirement that a responsible caregiver will receive you at your destination.  We require that drivers and caregivers are confirmed prior to your procedure.

## 2024-01-24 DIAGNOSIS — E78.00 HYPERCHOLESTEREMIA: ICD-10-CM

## 2024-01-24 RX ORDER — ROSUVASTATIN CALCIUM 10 MG/1
10 TABLET, COATED ORAL DAILY
Qty: 90 TABLET | Refills: 1 | Status: SHIPPED | OUTPATIENT
Start: 2024-01-24 | End: 2024-06-02 | Stop reason: DRUGHIGH

## 2024-02-18 ENCOUNTER — OFFICE VISIT (OUTPATIENT)
Dept: URGENT CARE | Facility: URGENT CARE | Age: 59
End: 2024-02-18
Payer: COMMERCIAL

## 2024-02-18 VITALS
SYSTOLIC BLOOD PRESSURE: 143 MMHG | OXYGEN SATURATION: 99 % | HEART RATE: 72 BPM | BODY MASS INDEX: 27.48 KG/M2 | DIASTOLIC BLOOD PRESSURE: 82 MMHG | WEIGHT: 140.7 LBS | TEMPERATURE: 97.4 F

## 2024-02-18 DIAGNOSIS — H10.31 ACUTE BACTERIAL CONJUNCTIVITIS OF RIGHT EYE: Primary | ICD-10-CM

## 2024-02-18 PROCEDURE — 99213 OFFICE O/P EST LOW 20 MIN: CPT | Performed by: PHYSICIAN ASSISTANT

## 2024-02-18 RX ORDER — GENTAMICIN SULFATE 3 MG/ML
2 SOLUTION/ DROPS OPHTHALMIC EVERY 4 HOURS
Qty: 5 ML | Refills: 0 | Status: SHIPPED | OUTPATIENT
Start: 2024-02-18 | End: 2024-05-23

## 2024-02-18 NOTE — PROGRESS NOTES
SUBJECTIVE:  Tomasa Darling is a 58 year old female who comes in with 1 day history of conjunctivitis.  Patient states last night her right eye became red and irritated and this morning was worse with some mattering and crusted shut and continues to have discharge.  She denies any trauma to the eye.  No vision changes.  She does not wear contacts.  She otherwise normal state of health.  Does have history of underlying allergies.    Past Medical History:   Diagnosis Date    Chronic rhinitis     Hypercholesteremia      Patient Active Problem List   Diagnosis    Chronic rhinitis    Symptomatic menopausal or female climacteric states    Hypercholesteremia     Current Outpatient Medications   Medication    Famotidine (PEPCID PO)    gabapentin (NEURONTIN) 300 MG capsule    NASONEX 50 MCG/ACT NA SUSP    rosuvastatin (CRESTOR) 10 MG tablet    triamcinolone (ARISTOCORT HP) 0.5 % external cream     No current facility-administered medications for this visit.     Social History     Socioeconomic History    Marital status: Single     Spouse name: Not on file    Number of children: Not on file    Years of education: Not on file    Highest education level: Not on file   Occupational History    Not on file   Tobacco Use    Smoking status: Former     Packs/day: 0.50     Years: 20.00     Additional pack years: 0.00     Total pack years: 10.00     Types: Cigarettes     Quit date: 3/8/2009     Years since quittin.9    Smokeless tobacco: Never   Substance and Sexual Activity    Alcohol use: No    Drug use: No    Sexual activity: Yes     Partners: Male   Other Topics Concern    Parent/sibling w/ CABG, MI or angioplasty before 65F 55M? Not Asked   Social History Narrative    Not on file     Social Determinants of Health     Financial Resource Strain: Low Risk  (10/18/2023)    Financial Resource Strain     Within the past 12 months, have you or your family members you live with been unable to get utilities (heat, electricity) when  it was really needed?: No   Food Insecurity: Low Risk  (10/18/2023)    Food Insecurity     Within the past 12 months, did you worry that your food would run out before you got money to buy more?: No     Within the past 12 months, did the food you bought just not last and you didn t have money to get more?: No   Transportation Needs: Low Risk  (10/18/2023)    Transportation Needs     Within the past 12 months, has lack of transportation kept you from medical appointments, getting your medicines, non-medical meetings or appointments, work, or from getting things that you need?: No   Physical Activity: Not on file   Stress: Not on file   Social Connections: Not on file   Interpersonal Safety: Low Risk  (10/24/2023)    Interpersonal Safety     Do you feel physically and emotionally safe where you currently live?: Yes     Within the past 12 months, have you been hit, slapped, kicked or otherwise physically hurt by someone?: No     Within the past 12 months, have you been humiliated or emotionally abused in other ways by your partner or ex-partner?: No   Housing Stability: Low Risk  (10/18/2023)    Housing Stability     Do you have housing? : Yes     Are you worried about losing your housing?: No     ROS negative other than stated  above    Exam:  GENERAL APPEARANCE: healthy, alert and no distress  EYES: Right conjunctival erythema mattering and discharge noted.  No periorbital cellulitis noted  RESP: lungs clear to auscultation - no rales, rhonchi or wheezes  CV: regular rates and rhythm, normal S1 S2, no S3 or S4 and no murmur, click or rub -  SKIN: no suspicious lesions or rashes    assessment/plan:  (H10.31) Acute bacterial conjunctivitis of right eye  (primary encounter diagnosis)  Comment:     Plan: gentamicin (GARAMYCIN) 0.3 % ophthalmic         solution          Patient with acute onset of 1 day history of right eye redness mattering and discharge.  Hygiene measures were reviewed.  Gentamicin as directed.   Follow-up with primary if symptoms worsen or new symptoms develop.  Does have allergies and may take over-the-counter antihistamines.

## 2024-02-20 ENCOUNTER — OFFICE VISIT (OUTPATIENT)
Dept: ALLERGY | Facility: CLINIC | Age: 59
End: 2024-02-20
Attending: INTERNAL MEDICINE
Payer: COMMERCIAL

## 2024-02-20 VITALS
BODY MASS INDEX: 27.89 KG/M2 | SYSTOLIC BLOOD PRESSURE: 124 MMHG | HEART RATE: 73 BPM | DIASTOLIC BLOOD PRESSURE: 79 MMHG | WEIGHT: 142.8 LBS | OXYGEN SATURATION: 97 %

## 2024-02-20 DIAGNOSIS — L29.9 ITCHING: ICD-10-CM

## 2024-02-20 PROCEDURE — 99213 OFFICE O/P EST LOW 20 MIN: CPT | Performed by: INTERNAL MEDICINE

## 2024-02-20 NOTE — LETTER
2/20/2024         RE: Tomasa Darling  [address] [facility] [address]        Dear Colleague,    Thank you for referring your patient, Tomasa Darling, to the [facility]. Please see a copy of my visit note below.    Tomasa Darling was seen in the Allergy Clinic at [facility].      Tomasa Darling is a 58 year old female being seen for ongoing evaluation of Itching.    Since the last visit the patient has been getting better with gabapentin at 900 mg in the evening.  Zyrtec did not provide benefit.  She is not noticing any significant side effects.  He describes how she used to take Advil PM at night which did work quite well.  Her sister who is a nurse did not recommend taking Advil on a daily basis so she stopped and has been having itching ever since.    With 900 mg of gabapentin she does get benefit but she still may awaken at night.    PAST ALLERGY HISTORY:     The itching started 2-3 years ago.  [place] and then developed a rash that lasted for 3 months.  The rash eventually resolved but the itching persists.  She saw dermatology and she has been treated with Medrol, cortisone and Zyrtec.  Only oral steroids provided partial benefit.     Complete metabolic panel, TSH, dust mite and dog IgE levels as well as CBC which were normal.  She did have mild elevation in alkaline phosphatase.     Her skin feels hot to her.  She does not have an obvious rash.       The itching is every day and all year-round.  The itching does not change depending on if she is home of if traveling.  The itch does worsen at night and it does awaken her at night.      The itching is primarily of the arms, breast, back, and upper chest and stomach.      Past Medical History:   Diagnosis Date     Chronic rhinitis      Hypercholesteremia      Family History   Problem Relation Age of Onset     Cancer Brother         bone and  lung cancer     Heart Disease Father         4 heart attacks     C.A.D. Father         rhematic fever as a child;  of MI     Musculoskeletal Disorder Father         gout     Family History Negative Mother      C.A.D. Mother         aortic valve issue     Cerebrovascular Disease Mother      Unknown/Adopted Brother         suicide     Psychotic Disorder Brother         depression     Past Surgical History:   Procedure Laterality Date     APPENDECTOMY           Current Outpatient Medications:      Famotidine (PEPCID PO), Take by mouth daily, Disp: , Rfl:      gabapentin (NEURONTIN) 300 MG capsule, Take 2 capsules (600 mg) by mouth at bedtime Take 1 cap at night, may increase dose up to three times daily if needed., Disp: 90 capsule, Rfl: 3     gentamicin (GARAMYCIN) 0.3 % ophthalmic solution, Place 2 drops into the right eye every 4 hours, Disp: 5 mL, Rfl: 0     rosuvastatin (CRESTOR) 10 MG tablet, TAKE 1 TABLET (10 MG) BY MOUTH DAILY., Disp: 90 tablet, Rfl: 1     triamcinolone (ARISTOCORT HP) 0.5 % external cream, Apply topically 2 times daily, Disp: 45 g, Rfl: 0     NASONEX 50 MCG/ACT NA SUSP, INHALE 2 SPRAYS IN EACH NOSTRIL ONE DAILY (Patient not taking: Reported on 2024), Disp: 1 Month, Rfl: 0-MD visit needed  Allergies   Allergen Reactions     Clindamycin          EXAM:   /79   Pulse 73   Wt 64.8 kg (142 lb 12.8 oz)   LMP 2012   SpO2 97%   BMI 27.89 kg/m      Constitutional:       General: She is not in acute distress.     Appearance: Normal appearance. She is not ill-appearing.   HENT:      Head: Normocephalic and atraumatic.     Psychiatric:         Mood and Affect: Mood normal.         Behavior: Behavior normal.        ASSESSMENT/PLAN:  Tomasa Darling is a 58 year old female seen today for ongoing evaluation of itching.  Responding to gabapentin but would like to consider alternatives.  She did respond to Advil PM prior to gabapentin.  She was hesitant to restart that.  The Benadryl  is likely the component of the Advil PM providing benefit.  She can try 50 mg at night.  She will stop gabapentin at this time and may consider amitriptyline in the future depending on the benefit that she receives from the Benadryl.    Benadryl 50 mg at night, may repeat (will get sleepy)  Stop Gabapentin, may take 1-3 tabs as needed  May consider Amitriptyline    Follow-up in 2 months      Thank you for allowing me to participate in the care of Tomasa Darling.      I spent 20 minutes on the date of the encounter doing chart review, history and exam, documentation and further coordination as noted above exclusive of separately reported interpretations    Asher Arita MD  Allergy/Immunology  [facility]      Again, thank you for allowing me to participate in the care of your patient.        Sincerely,        Asher Arita MD

## 2024-02-20 NOTE — PATIENT INSTRUCTIONS
Benadryl 50 mg at night, may repeat (will get sleepy)  Stop Gabapentin, may take 1-3 tabs as needed  May consider Amitriptyline

## 2024-02-20 NOTE — PROGRESS NOTES
Tomasa Darling was seen in the Allergy Clinic at [facility].      Tomasa Darling is a 58 year old female being seen for ongoing evaluation of Itching.    Since the last visit the patient has been getting better with gabapentin at 900 mg in the evening.  Zyrtec did not provide benefit.  She is not noticing any significant side effects.  He describes how she used to take Advil PM at night which did work quite well.  Her sister who is a nurse did not recommend taking Advil on a daily basis so she stopped and has been having itching ever since.    With 900 mg of gabapentin she does get benefit but she still may awaken at night.    PAST ALLERGY HISTORY:     The itching started 2-3 years ago.  She [place] and then developed a rash that lasted for 3 months.  The rash eventually resolved but the itching persists.  She saw dermatology and she has been treated with Medrol, cortisone and Zyrtec.  Only oral steroids provided partial benefit.     Complete metabolic panel, TSH, dust mite and dog IgE levels as well as CBC which were normal.  She did have mild elevation in alkaline phosphatase.     Her skin feels hot to her.  She does not have an obvious rash.       The itching is every day and all year-round.  The itching does not change depending on if she is home of if traveling.  The itch does worsen at night and it does awaken her at night.      The itching is primarily of the arms, breast, back, and upper chest and stomach.      Past Medical History:   Diagnosis Date    Chronic rhinitis     Hypercholesteremia      Family History   Problem Relation Age of Onset    Cancer Brother         bone and lung cancer    Heart Disease Father         4 heart attacks    C.A.D. Father         rhematic fever as a child;  of MI    Musculoskeletal Disorder Father         gout    Family History Negative Mother     C.A.D. Mother         aortic valve issue     Cerebrovascular Disease Mother     Unknown/Adopted Brother         suicide    Psychotic Disorder Brother         depression     Past Surgical History:   Procedure Laterality Date    APPENDECTOMY           Current Outpatient Medications:     Famotidine (PEPCID PO), Take by mouth daily, Disp: , Rfl:     gabapentin (NEURONTIN) 300 MG capsule, Take 2 capsules (600 mg) by mouth at bedtime Take 1 cap at night, may increase dose up to three times daily if needed., Disp: 90 capsule, Rfl: 3    gentamicin (GARAMYCIN) 0.3 % ophthalmic solution, Place 2 drops into the right eye every 4 hours, Disp: 5 mL, Rfl: 0    rosuvastatin (CRESTOR) 10 MG tablet, TAKE 1 TABLET (10 MG) BY MOUTH DAILY., Disp: 90 tablet, Rfl: 1    triamcinolone (ARISTOCORT HP) 0.5 % external cream, Apply topically 2 times daily, Disp: 45 g, Rfl: 0    NASONEX 50 MCG/ACT NA SUSP, INHALE 2 SPRAYS IN EACH NOSTRIL ONE DAILY (Patient not taking: Reported on 2/20/2024), Disp: 1 Month, Rfl: 0-MD visit needed  Allergies   Allergen Reactions    Clindamycin          EXAM:   /79   Pulse 73   Wt 64.8 kg (142 lb 12.8 oz)   LMP 07/31/2012   SpO2 97%   BMI 27.89 kg/m      Constitutional:       General: She is not in acute distress.     Appearance: Normal appearance. She is not ill-appearing.   HENT:      Head: Normocephalic and atraumatic.     Psychiatric:         Mood and Affect: Mood normal.         Behavior: Behavior normal.        ASSESSMENT/PLAN:  Tomasa Darling is a 58 year old female seen today for ongoing evaluation of itching.  Responding to gabapentin but would like to consider alternatives.  She did respond to Advil PM prior to gabapentin.  She was hesitant to restart that.  The Benadryl is likely the component of the Advil PM providing benefit.  She can try 50 mg at night.  She will stop gabapentin at this time and may consider amitriptyline in the future depending on the benefit that she receives from the Benadryl.    Benadryl 50 mg at night, may  repeat (will get sleepy)  Stop Gabapentin, may take 1-3 tabs as needed  May consider Amitriptyline    Follow-up in 2 months      Thank you for allowing me to participate in the care of Tomasa Darling.      I spent 20 minutes on the date of the encounter doing chart review, history and exam, documentation and further coordination as noted above exclusive of separately reported interpretations    Asher Arita MD  Allergy/Immunology  [facility]

## 2024-02-21 ENCOUNTER — TELEPHONE (OUTPATIENT)
Dept: ALLERGY | Facility: CLINIC | Age: 59
End: 2024-02-21
Payer: COMMERCIAL

## 2024-04-15 ENCOUNTER — TELEPHONE (OUTPATIENT)
Dept: INTERNAL MEDICINE | Facility: CLINIC | Age: 59
End: 2024-04-15
Payer: COMMERCIAL

## 2024-04-15 NOTE — TELEPHONE ENCOUNTER
Patient Quality Outreach    Patient is due for the following:   Colon Cancer Screening    Next Steps:   No follow up needed at this time.    Type of outreach:    Patient has appointment    Next Steps:  Reach out within 90 days via  none neede .    Max number of attempts reached: Yes. Will try again in 90 days if patient still on fail list.    Questions for provider review:    None           Yazmin Medel MA  Chart routed to none.

## 2024-05-23 ENCOUNTER — OFFICE VISIT (OUTPATIENT)
Dept: INTERNAL MEDICINE | Facility: CLINIC | Age: 59
End: 2024-05-23
Attending: INTERNAL MEDICINE
Payer: COMMERCIAL

## 2024-05-23 VITALS
DIASTOLIC BLOOD PRESSURE: 76 MMHG | WEIGHT: 138.7 LBS | OXYGEN SATURATION: 97 % | HEIGHT: 60 IN | SYSTOLIC BLOOD PRESSURE: 116 MMHG | HEART RATE: 82 BPM | BODY MASS INDEX: 27.23 KG/M2 | RESPIRATION RATE: 13 BRPM | TEMPERATURE: 97.1 F

## 2024-05-23 DIAGNOSIS — E78.2 MIXED HYPERLIPIDEMIA: Primary | ICD-10-CM

## 2024-05-23 DIAGNOSIS — L29.9 ITCHING: ICD-10-CM

## 2024-05-23 PROCEDURE — 99213 OFFICE O/P EST LOW 20 MIN: CPT | Performed by: INTERNAL MEDICINE

## 2024-05-23 PROCEDURE — 80076 HEPATIC FUNCTION PANEL: CPT | Performed by: INTERNAL MEDICINE

## 2024-05-23 PROCEDURE — G2211 COMPLEX E/M VISIT ADD ON: HCPCS | Performed by: INTERNAL MEDICINE

## 2024-05-23 PROCEDURE — 36415 COLL VENOUS BLD VENIPUNCTURE: CPT | Performed by: INTERNAL MEDICINE

## 2024-05-23 PROCEDURE — 80061 LIPID PANEL: CPT | Performed by: INTERNAL MEDICINE

## 2024-05-23 NOTE — NURSING NOTE
/76   Pulse 82   Temp 97.1  F (36.2  C) (Tympanic)   Resp 13   Ht 1.524 m (5')   Wt 62.9 kg (138 lb 11.2 oz)   LMP 07/31/2012   SpO2 97%   BMI 27.09 kg/m

## 2024-05-23 NOTE — PROGRESS NOTES
Assessment & Plan     (E78.2) Mixed hyperlipidemia  (primary encounter diagnosis)  Plan: pt has taken 20 tablets of rosuvastatin in the last 6 months, once a week or less , had very high cholesterol of 318 and  in 10/23 ,  check Lipid panel reflex to direct LDL Fasting, Hepatic function panel pt was told I will contact her after results and proceed accordingly.       (L29.9) Itching  Plan:pt  has seen allergist in the past and has allergy work up , pt has stopped taking zyrtec,  probably  idiopathic urticaria, pt was advised to take Famotidine 20 mg bid and zyrtec 10 mg daily for 2 wks and then take as needed, check  Hepatic function panel               BMI  Estimated body mass index is 27.09 kg/m  as calculated from the following:    Height as of this encounter: 1.524 m (5').    Weight as of this encounter: 62.9 kg (138 lb 11.2 oz).   Weight management plan: Discussed healthy diet and exercise guidelines        Brittany Randolph is a 58 year old, presenting for the following health issues:  Lipids      5/23/2024     1:23 PM   Additional Questions   Roomed by alhaji   Accompanied by self         5/23/2024     1:23 PM   Patient Reported Additional Medications   Patient reports taking the following new medications none     History of Present Illness       Reason for visit:  Physical, blood work, check cholesterol    She eats 2-3 servings of fruits and vegetables daily.She consumes 0 sweetened beverage(s) daily.She exercises with enough effort to increase her heart rate 20 to 29 minutes per day.  She exercises with enough effort to increase her heart rate 5 days per week. She is missing 4 dose(s) of medications per week.  She is not taking prescribed medications regularly due to remembering to take.     Pt initially scheduled for physical but she is not due for physical until 10/24     Hyperlipidemia Follow-Up    Are you regularly taking any medication or supplement to lower your cholesterol?   No,  pt was   started on rosuvastatin at last office visit(10/23) patient states that she forgets to take the medication and so far has taken 20 tablets in the last 7 months.  Are you having muscle aches or other side effects that you think could be caused by your cholesterol lowering medication?  No  How many servings of fruits and vegetables do you eat daily?  2-3      Patient also complains of itching on her body mainly occurs during the night, has seen allergist in the past and has been prescribed gabapentin and Zyrtec without much help.-      On average, how many sweetened beverages do you drink each day (Examples: soda, juice, sweet tea, etc.  Do NOT count diet or artificially sweetened beverages)?   1  How many days per week do you exercise enough to make your heart beat faster? 5  How many minutes a day do you exercise enough to make your heart beat faster? 30 - 60  How many days per week do you miss taking your medication? 0        Past Medical History:   Diagnosis Date    Chronic rhinitis     Hypercholesteremia        Current Outpatient Medications   Medication Sig Dispense Refill    Famotidine (PEPCID PO) Take by mouth daily      rosuvastatin (CRESTOR) 10 MG tablet TAKE 1 TABLET (10 MG) BY MOUTH DAILY. 90 tablet 1    triamcinolone (ARISTOCORT HP) 0.5 % external cream Apply topically 2 times daily 45 g 0           Review of Systems  CONSTITUTIONAL: NEGATIVE for fever, chills,   INTEGUMENTARY/SKIN: itching   RESP: NEGATIVE for significant cough or SOB  CV: NEGATIVE for chest pain, palpitations or peripheral edema      Objective    /76   Pulse 82   Temp 97.1  F (36.2  C) (Tympanic)   Resp 13   Ht 1.524 m (5')   Wt 62.9 kg (138 lb 11.2 oz)   LMP 07/31/2012   SpO2 97%   BMI 27.09 kg/m    Body mass index is 27.09 kg/m .  Physical Exam   GENERAL: alert and no distress  RESP: lungs clear to auscultation - no rales, rhonchi or wheezes  CV: regular rate and rhythm, normal S1 S2,    MS: no gross musculoskeletal  defects noted, no edema  SKIN: no suspicious lesions or rashes  PSYCH: mentation appears normal, affect normal/bright           Signed Electronically by: Erica Green MD

## 2024-05-24 LAB
ALBUMIN SERPL BCG-MCNC: 4.4 G/DL (ref 3.5–5.2)
ALP SERPL-CCNC: 118 U/L (ref 40–150)
ALT SERPL W P-5'-P-CCNC: 23 U/L (ref 0–50)
AST SERPL W P-5'-P-CCNC: 25 U/L (ref 0–45)
BILIRUB DIRECT SERPL-MCNC: <0.2 MG/DL (ref 0–0.3)
BILIRUB SERPL-MCNC: 0.3 MG/DL
CHOLEST SERPL-MCNC: 238 MG/DL
FASTING STATUS PATIENT QL REPORTED: YES
HDLC SERPL-MCNC: 52 MG/DL
LDLC SERPL CALC-MCNC: 142 MG/DL
NONHDLC SERPL-MCNC: 186 MG/DL
PROT SERPL-MCNC: 7.6 G/DL (ref 6.4–8.3)
TRIGL SERPL-MCNC: 221 MG/DL

## 2024-06-02 DIAGNOSIS — E78.2 MIXED HYPERLIPIDEMIA: Primary | ICD-10-CM

## 2024-06-02 RX ORDER — ROSUVASTATIN CALCIUM 5 MG/1
5 TABLET, COATED ORAL DAILY
Qty: 90 TABLET | Refills: 1 | Status: SHIPPED | OUTPATIENT
Start: 2024-06-02

## 2024-06-13 ENCOUNTER — TELEPHONE (OUTPATIENT)
Dept: GASTROENTEROLOGY | Facility: CLINIC | Age: 59
End: 2024-06-13
Payer: COMMERCIAL

## 2024-06-13 NOTE — TELEPHONE ENCOUNTER
Pre assessment completed for upcoming procedure.      Procedure details:    Patient scheduled for Colonoscopy  on 6.21.24.     Arrival time: 0645. Procedure time 0715    Facility location: [facility]; [address] [place] [address]. Check in location: Main entrance at . Come through the roundabout underneath the awning (not the ER entrance).    Sedation type: Conscious sedation     Pre op exam needed? N/A    Indication for procedure: screening    Designated  policy reviewed. Instructed to have someone stay 6 hours post procedure.       Chart review:     Electronic implanted devices? No    Recent diagnosis of diverticulitis within the last 6 weeks?  No    Diabetic? No      Medication review:    Anticoagulants? No    NSAIDS? Yes.  Ibuprofen (Advil, Motrin).  Holding interval of 1 day before procedure.    Other medication HOLDING recommendations:  N/A      Prep for procedure:     Bowel prep recommendation: Standard Miralax  Due to: standard bowel prep.    Prep instructions sent via [employer]     Reviewed procedure prep instructions.     Patient verbalized understanding and had no questions or concerns at this time.        Sanjana Walker RN  Endoscopy Procedure Pre Assessment   [phone] option 4

## 2024-06-21 ENCOUNTER — HOSPITAL ENCOUNTER (OUTPATIENT)
Facility: CLINIC | Age: 59
Discharge: HOME | End: 2024-06-21
Attending: INTERNAL MEDICINE | Admitting: INTERNAL MEDICINE
Payer: COMMERCIAL

## 2024-06-21 VITALS
OXYGEN SATURATION: 97 % | RESPIRATION RATE: 16 BRPM | DIASTOLIC BLOOD PRESSURE: 64 MMHG | BODY MASS INDEX: 27.09 KG/M2 | HEIGHT: 60 IN | HEART RATE: 54 BPM | SYSTOLIC BLOOD PRESSURE: 115 MMHG | WEIGHT: 138 LBS

## 2024-06-21 LAB — COLONOSCOPY: NORMAL

## 2024-06-21 PROCEDURE — 45378 DIAGNOSTIC COLONOSCOPY: CPT | Performed by: INTERNAL MEDICINE

## 2024-06-21 PROCEDURE — 250N000011 HC RX IP 250 OP 636: Mod: JZ | Performed by: INTERNAL MEDICINE

## 2024-06-21 PROCEDURE — 258N000003 HC RX IP 258 OP 636: Mod: JZ | Performed by: INTERNAL MEDICINE

## 2024-06-21 PROCEDURE — G0121 COLON CA SCRN NOT HI RSK IND: HCPCS | Performed by: INTERNAL MEDICINE

## 2024-06-21 PROCEDURE — G0500 MOD SEDAT ENDO SERVICE >5YRS: HCPCS | Performed by: INTERNAL MEDICINE

## 2024-06-21 RX ORDER — ONDANSETRON 4 MG/1
4 TABLET, ORALLY DISINTEGRATING ORAL EVERY 6 HOURS PRN
Status: DISCONTINUED | OUTPATIENT
Start: 2024-06-21 | End: 2024-06-21 | Stop reason: HOSPADM

## 2024-06-21 RX ORDER — ATROPINE SULFATE 0.1 MG/ML
1 INJECTION INTRAVENOUS
Status: DISCONTINUED | OUTPATIENT
Start: 2024-06-21 | End: 2024-06-21 | Stop reason: HOSPADM

## 2024-06-21 RX ORDER — FENTANYL CITRATE 50 UG/ML
50-100 INJECTION, SOLUTION INTRAMUSCULAR; INTRAVENOUS EVERY 5 MIN PRN
Status: DISCONTINUED | OUTPATIENT
Start: 2024-06-21 | End: 2024-06-21 | Stop reason: HOSPADM

## 2024-06-21 RX ORDER — EPINEPHRINE 1 MG/ML
0.1 INJECTION, SOLUTION INTRAMUSCULAR; SUBCUTANEOUS
Status: DISCONTINUED | OUTPATIENT
Start: 2024-06-21 | End: 2024-06-21 | Stop reason: HOSPADM

## 2024-06-21 RX ORDER — PROCHLORPERAZINE MALEATE 10 MG
10 TABLET ORAL EVERY 6 HOURS PRN
Status: DISCONTINUED | OUTPATIENT
Start: 2024-06-21 | End: 2024-06-21 | Stop reason: HOSPADM

## 2024-06-21 RX ORDER — ONDANSETRON 2 MG/ML
4 INJECTION INTRAMUSCULAR; INTRAVENOUS
Status: DISCONTINUED | OUTPATIENT
Start: 2024-06-21 | End: 2024-06-21 | Stop reason: HOSPADM

## 2024-06-21 RX ORDER — PROCHLORPERAZINE MALEATE 10 MG
10 TABLET ORAL EVERY 6 HOURS PRN
Status: CANCELLED | OUTPATIENT
Start: 2024-06-21

## 2024-06-21 RX ORDER — NALOXONE HYDROCHLORIDE 0.4 MG/ML
0.4 INJECTION, SOLUTION INTRAMUSCULAR; INTRAVENOUS; SUBCUTANEOUS
Status: DISCONTINUED | OUTPATIENT
Start: 2024-06-21 | End: 2024-06-21 | Stop reason: HOSPADM

## 2024-06-21 RX ORDER — DIPHENHYDRAMINE HYDROCHLORIDE 50 MG/ML
25-50 INJECTION INTRAMUSCULAR; INTRAVENOUS
Status: DISCONTINUED | OUTPATIENT
Start: 2024-06-21 | End: 2024-06-21 | Stop reason: HOSPADM

## 2024-06-21 RX ORDER — ONDANSETRON 2 MG/ML
4 INJECTION INTRAMUSCULAR; INTRAVENOUS EVERY 6 HOURS PRN
Status: DISCONTINUED | OUTPATIENT
Start: 2024-06-21 | End: 2024-06-21 | Stop reason: HOSPADM

## 2024-06-21 RX ORDER — SIMETHICONE 40MG/0.6ML
133 SUSPENSION, DROPS(FINAL DOSAGE FORM)(ML) ORAL
Status: DISCONTINUED | OUTPATIENT
Start: 2024-06-21 | End: 2024-06-21 | Stop reason: HOSPADM

## 2024-06-21 RX ORDER — ONDANSETRON 4 MG/1
4 TABLET, ORALLY DISINTEGRATING ORAL EVERY 6 HOURS PRN
Status: CANCELLED | OUTPATIENT
Start: 2024-06-21

## 2024-06-21 RX ORDER — LIDOCAINE 40 MG/G
CREAM TOPICAL
Status: DISCONTINUED | OUTPATIENT
Start: 2024-06-21 | End: 2024-06-21 | Stop reason: HOSPADM

## 2024-06-21 RX ORDER — FLUMAZENIL 0.1 MG/ML
0.2 INJECTION, SOLUTION INTRAVENOUS
Status: DISCONTINUED | OUTPATIENT
Start: 2024-06-21 | End: 2024-06-21 | Stop reason: HOSPADM

## 2024-06-21 RX ORDER — NALOXONE HYDROCHLORIDE 0.4 MG/ML
0.2 INJECTION, SOLUTION INTRAMUSCULAR; INTRAVENOUS; SUBCUTANEOUS
Status: DISCONTINUED | OUTPATIENT
Start: 2024-06-21 | End: 2024-06-21 | Stop reason: HOSPADM

## 2024-06-21 RX ORDER — ONDANSETRON 2 MG/ML
4 INJECTION INTRAMUSCULAR; INTRAVENOUS EVERY 6 HOURS PRN
Status: CANCELLED | OUTPATIENT
Start: 2024-06-21

## 2024-06-21 RX ORDER — FLUMAZENIL 0.1 MG/ML
0.2 INJECTION, SOLUTION INTRAVENOUS
Status: CANCELLED | OUTPATIENT
Start: 2024-06-21 | End: 2024-06-21

## 2024-06-21 RX ADMIN — FENTANYL CITRATE 100 MCG: 50 INJECTION, SOLUTION INTRAMUSCULAR; INTRAVENOUS at 07:20

## 2024-06-21 RX ADMIN — MIDAZOLAM 2 MG: 1 INJECTION INTRAMUSCULAR; INTRAVENOUS at 07:20

## 2024-06-21 RX ADMIN — SODIUM CHLORIDE 500 ML: 9 INJECTION, SOLUTION INTRAVENOUS at 07:18

## 2024-06-21 ASSESSMENT — ACTIVITIES OF DAILY LIVING (ADL)
ADLS_ACUITY_SCORE: 35
ADLS_ACUITY_SCORE: 35

## 2024-06-21 NOTE — H&P
Pre-Endoscopy History and Physical     Tomasa Darling MRN# [id number]   YOB: 1965 Age: 58 year old     Date of Procedure: 2024  Primary care provider: Erica Green  Type of Endoscopy: Colonoscopy with possible biopsy, possible polypectomy  Reason for Procedure: screen  Type of Anesthesia Anticipated: Conscious Sedation    HPI:    Tomasa is a 58 year old female who will be undergoing the above procedure.      A history and physical has been performed. The patient's medications and allergies have been reviewed. The risks and benefits of the procedure and the sedation options and risks were discussed with the patient.  All questions were answered and informed consent was obtained.      She denies a personal or family history of anesthesia complications or bleeding disorders.     Patient Active Problem List   Diagnosis    Chronic rhinitis    Symptomatic menopausal or female climacteric states    Mixed hyperlipidemia        Past Medical History:   Diagnosis Date    Chronic rhinitis     Hypercholesteremia         Past Surgical History:   Procedure Laterality Date    APPENDECTOMY         Social History     Tobacco Use    Smoking status: Former     Current packs/day: 0.00     Average packs/day: 0.5 packs/day for 20.0 years (10.0 ttl pk-yrs)     Types: Cigarettes     Start date: 3/8/1989     Quit date: 3/8/2009     Years since quitting: 15.2    Smokeless tobacco: Never   Substance Use Topics    Alcohol use: No       Family History   Problem Relation Age of Onset    Cancer Brother         bone and lung cancer    Heart Disease Father         4 heart attacks    C.A.D. Father         rhematic fever as a child;  of MI    Musculoskeletal Disorder Father         gout    Family History Negative Mother     C.A.D. Mother         aortic valve issue    Cerebrovascular Disease Mother     Unknown/Adopted Brother         suicide    Psychotic Disorder Brother         depression       Prior to Admission  medications    Medication Sig Start Date End Date Taking? Authorizing Provider   Famotidine (PEPCID PO) Take by mouth daily    Reported, Patient   rosuvastatin (CRESTOR) 5 MG tablet Take 1 tablet (5 mg) by mouth daily 6/2/24   Erica Green MD   triamcinolone (ARISTOCORT HP) 0.5 % external cream Apply topically 2 times daily 7/12/22   Kristian Holguin, PA-C       Allergies   Allergen Reactions    Clindamycin         REVIEW OF SYSTEMS:   5 point ROS negative except as noted above in HPI, including Gen., Resp., CV, GI &  system review.    PHYSICAL EXAM:   LMP 07/31/2012  Estimated body mass index is 27.09 kg/m  as calculated from the following:    Height as of 5/23/24: 1.524 m (5').    Weight as of 5/23/24: 62.9 kg (138 lb 11.2 oz).   GENERAL APPEARANCE: alert, and oriented  MENTAL STATUS: alert  AIRWAY EXAM: Mallampatti Class I (visualization of the soft palate, fauces, uvula, anterior and posterior pillars)  RESP: lungs clear to auscultation - no rales, rhonchi or wheezes  CV: regular rates and rhythm  DIAGNOSTICS:    Not indicated    IMPRESSION   ASA Class 1 - Healthy patient, no medical problems    PLAN:   Plan for Colonoscopy with possible biopsy, possible polypectomy. We discussed the risks, benefits and alternatives and the patient wished to proceed.    The above has been forwarded to the consulting provider.      Signed Electronically by: Natan Mckinney MD  June 21, 2024

## 2024-08-28 ENCOUNTER — OFFICE VISIT (OUTPATIENT)
Dept: INTERNAL MEDICINE | Facility: CLINIC | Age: 59
End: 2024-08-28
Payer: COMMERCIAL

## 2024-08-28 VITALS
SYSTOLIC BLOOD PRESSURE: 122 MMHG | WEIGHT: 135.3 LBS | OXYGEN SATURATION: 99 % | RESPIRATION RATE: 18 BRPM | TEMPERATURE: 98.7 F | BODY MASS INDEX: 26.56 KG/M2 | HEART RATE: 84 BPM | HEIGHT: 60 IN | DIASTOLIC BLOOD PRESSURE: 70 MMHG

## 2024-08-28 DIAGNOSIS — E78.2 MIXED HYPERLIPIDEMIA: Primary | ICD-10-CM

## 2024-08-28 DIAGNOSIS — L29.9 ITCHING: ICD-10-CM

## 2024-08-28 LAB
ALBUMIN SERPL BCG-MCNC: 4.3 G/DL (ref 3.5–5.2)
ALP SERPL-CCNC: 111 U/L (ref 40–150)
ALT SERPL W P-5'-P-CCNC: 17 U/L (ref 0–50)
ANION GAP SERPL CALCULATED.3IONS-SCNC: 11 MMOL/L (ref 7–15)
AST SERPL W P-5'-P-CCNC: 28 U/L (ref 0–45)
BILIRUB DIRECT SERPL-MCNC: <0.2 MG/DL (ref 0–0.3)
BILIRUB SERPL-MCNC: 0.3 MG/DL
BUN SERPL-MCNC: 10.2 MG/DL (ref 8–23)
CALCIUM SERPL-MCNC: 9.5 MG/DL (ref 8.8–10.4)
CHLORIDE SERPL-SCNC: 105 MMOL/L (ref 98–107)
CHOLEST SERPL-MCNC: 213 MG/DL
CREAT SERPL-MCNC: 0.84 MG/DL (ref 0.51–0.95)
EGFRCR SERPLBLD CKD-EPI 2021: 79 ML/MIN/1.73M2
FASTING STATUS PATIENT QL REPORTED: YES
FASTING STATUS PATIENT QL REPORTED: YES
GLUCOSE SERPL-MCNC: 91 MG/DL (ref 70–99)
HCO3 SERPL-SCNC: 26 MMOL/L (ref 22–29)
HDLC SERPL-MCNC: 52 MG/DL
LDLC SERPL CALC-MCNC: 125 MG/DL
NONHDLC SERPL-MCNC: 161 MG/DL
POTASSIUM SERPL-SCNC: 4.6 MMOL/L (ref 3.4–5.3)
PROT SERPL-MCNC: 7.8 G/DL (ref 6.4–8.3)
SODIUM SERPL-SCNC: 142 MMOL/L (ref 135–145)
TRIGL SERPL-MCNC: 180 MG/DL

## 2024-08-28 PROCEDURE — 80053 COMPREHEN METABOLIC PANEL: CPT

## 2024-08-28 PROCEDURE — 82248 BILIRUBIN DIRECT: CPT

## 2024-08-28 PROCEDURE — 99214 OFFICE O/P EST MOD 30 MIN: CPT

## 2024-08-28 PROCEDURE — 36415 COLL VENOUS BLD VENIPUNCTURE: CPT

## 2024-08-28 PROCEDURE — 80061 LIPID PANEL: CPT

## 2024-08-28 RX ORDER — HYDROXYZINE HYDROCHLORIDE 25 MG/1
25 TABLET, FILM COATED ORAL AT BEDTIME
Qty: 90 TABLET | Refills: 0 | Status: SHIPPED | OUTPATIENT
Start: 2024-08-28 | End: 2024-11-26

## 2024-08-28 NOTE — PROGRESS NOTES
Assessment & Plan     Mixed hyperlipidemia  Patient presents to the clinic for a follow-up for mixed hyperlipidemia. Patient is currently taking 5 mg of rosuvastatin daily. Patient does have a complaint of itching primarily at night.  Will order labs to rule out liver, kidney functions or electrolyte deficiency.  - Lipid panel reflex to direct LDL Fasting; Future  - Hepatic panel (Albumin, ALT, AST, Bili, Alk Phos, TP); Future  - Basic metabolic panel  (Ca, Cl, CO2, Creat, Gluc, K, Na, BUN); Future  - Lipid panel reflex to direct LDL Fasting  - Hepatic panel (Albumin, ALT, AST, Bili, Alk Phos, TP)  - Basic metabolic panel  (Ca, Cl, CO2, Creat, Gluc, K, Na, BUN)    Itching  Labs pending.  - Hepatic panel (Albumin, ALT, AST, Bili, Alk Phos, TP); Future  - Basic metabolic panel  (Ca, Cl, CO2, Creat, Gluc, K, Na, BUN); Future  - hydrOXYzine HCl (ATARAX) 25 MG tablet; Take 1 tablet (25 mg) by mouth at bedtime.  - Hepatic panel (Albumin, ALT, AST, Bili, Alk Phos, TP)  - Basic metabolic panel  (Ca, Cl, CO2, Creat, Gluc, K, Na, BUN)        30 minutes spent by me on the date of the encounter doing chart review, review of test results, interpretation of tests, patient visit, and documentation           Brittany Randolph is a 58 year old, presenting for the following health issues:  Patient is here for a cholesterol and liver function test. She was told by her PCP to get her liver function tests every 3 months.   She also has been having horrible itching only at night.   When she lays in water and that has helped.     RECHECK (She is on Crestor and need a three month labs. She is also wondering if she could lower her cholesterol medication dosage.)      8/28/2024     8:12 AM   Additional Questions   Roomed by Britney Crane MA   Accompanied by Self     History of Present Illness       Hyperlipidemia:  She presents for follow up of hyperlipidemia.   She is taking medication to lower cholesterol. She is not having myalgia or  other side effects to statin medications.    Reason for visit:  Check liver function rosuvsatin    She eats 2-3 servings of fruits and vegetables daily.She consumes 0 sweetened beverage(s) daily.She exercises with enough effort to increase her heart rate 30 to 60 minutes per day.  She exercises with enough effort to increase her heart rate 4 days per week.   She is taking medications regularly.                 Review of Systems  Constitutional, HEENT, cardiovascular, pulmonary, gi and gu systems are negative, except as otherwise noted.      Objective    /70 (BP Location: Left arm, Patient Position: Sitting, Cuff Size: Adult Large)   Pulse 84   Temp 98.7  F (37.1  C) (Oral)   Resp 18   Ht 1.524 m (5')   Wt 61.4 kg (135 lb 4.8 oz)   LMP 07/31/2012   SpO2 99%   BMI 26.42 kg/m    Body mass index is 26.42 kg/m .  Physical Exam   GENERAL: alert and no distress  MS: no gross musculoskeletal defects noted, no edema  SKIN: no suspicious lesions or rashes  PSYCH: mentation appears normal, affect normal/bright            Signed Electronically by: KATRIN Mccullough CNP

## 2024-09-24 ENCOUNTER — PATIENT OUTREACH (OUTPATIENT)
Dept: CARE COORDINATION | Facility: CLINIC | Age: 59
End: 2024-09-24
Payer: COMMERCIAL

## 2024-10-04 ENCOUNTER — HOSPITAL ENCOUNTER (OUTPATIENT)
Dept: MAMMOGRAPHY | Facility: CLINIC | Age: 59
Discharge: HOME | End: 2024-10-04
Attending: INTERNAL MEDICINE | Admitting: INTERNAL MEDICINE
Payer: COMMERCIAL

## 2024-10-04 DIAGNOSIS — Z12.31 VISIT FOR SCREENING MAMMOGRAM: ICD-10-CM

## 2024-10-04 PROCEDURE — 77063 BREAST TOMOSYNTHESIS BI: CPT

## 2024-11-24 DIAGNOSIS — L29.9 ITCHING: ICD-10-CM

## 2024-11-25 RX ORDER — HYDROXYZINE HYDROCHLORIDE 25 MG/1
25 TABLET, FILM COATED ORAL
Qty: 30 TABLET | Refills: 0 | Status: SHIPPED | OUTPATIENT
Start: 2024-11-25

## 2024-11-25 NOTE — TELEPHONE ENCOUNTER
Medication passed protocol, however, refill RN could not approve because needing provider review. Should therapy be completed at this time or continued? Provider, please approve or deny the prescription.    Marce EDWARDS, RN, PHN

## 2024-12-03 DIAGNOSIS — E78.2 MIXED HYPERLIPIDEMIA: ICD-10-CM

## 2024-12-03 RX ORDER — ROSUVASTATIN CALCIUM 5 MG/1
5 TABLET, COATED ORAL DAILY
Qty: 90 TABLET | Refills: 0 | Status: SHIPPED | OUTPATIENT
Start: 2024-12-03

## 2024-12-12 SDOH — HEALTH STABILITY: PHYSICAL HEALTH: ON AVERAGE, HOW MANY MINUTES DO YOU ENGAGE IN EXERCISE AT THIS LEVEL?: 30 MIN

## 2024-12-12 SDOH — HEALTH STABILITY: PHYSICAL HEALTH: ON AVERAGE, HOW MANY DAYS PER WEEK DO YOU ENGAGE IN MODERATE TO STRENUOUS EXERCISE (LIKE A BRISK WALK)?: 3 DAYS

## 2024-12-12 ASSESSMENT — SOCIAL DETERMINANTS OF HEALTH (SDOH): HOW OFTEN DO YOU GET TOGETHER WITH FRIENDS OR RELATIVES?: TWICE A WEEK

## 2024-12-31 DIAGNOSIS — L29.9 ITCHING: ICD-10-CM

## 2024-12-31 RX ORDER — HYDROXYZINE HYDROCHLORIDE 25 MG/1
25 TABLET, FILM COATED ORAL
Qty: 90 TABLET | Refills: 1 | Status: SHIPPED | OUTPATIENT
Start: 2024-12-31

## 2025-01-20 SDOH — HEALTH STABILITY: PHYSICAL HEALTH: ON AVERAGE, HOW MANY MINUTES DO YOU ENGAGE IN EXERCISE AT THIS LEVEL?: 30 MIN

## 2025-01-20 SDOH — HEALTH STABILITY: PHYSICAL HEALTH: ON AVERAGE, HOW MANY DAYS PER WEEK DO YOU ENGAGE IN MODERATE TO STRENUOUS EXERCISE (LIKE A BRISK WALK)?: 2 DAYS

## 2025-01-20 ASSESSMENT — SOCIAL DETERMINANTS OF HEALTH (SDOH): HOW OFTEN DO YOU GET TOGETHER WITH FRIENDS OR RELATIVES?: TWICE A WEEK

## 2025-01-21 ENCOUNTER — OFFICE VISIT (OUTPATIENT)
Dept: INTERNAL MEDICINE | Facility: CLINIC | Age: 60
End: 2025-01-21
Payer: COMMERCIAL

## 2025-01-21 ENCOUNTER — ANCILLARY PROCEDURE (OUTPATIENT)
Dept: GENERAL RADIOLOGY | Facility: CLINIC | Age: 60
End: 2025-01-21
Attending: INTERNAL MEDICINE
Payer: COMMERCIAL

## 2025-01-21 VITALS
BODY MASS INDEX: 28.03 KG/M2 | DIASTOLIC BLOOD PRESSURE: 72 MMHG | WEIGHT: 142.8 LBS | OXYGEN SATURATION: 97 % | SYSTOLIC BLOOD PRESSURE: 116 MMHG | HEART RATE: 81 BPM | RESPIRATION RATE: 13 BRPM | HEIGHT: 60 IN | TEMPERATURE: 96.5 F

## 2025-01-21 DIAGNOSIS — M79.10 MYALGIA: ICD-10-CM

## 2025-01-21 DIAGNOSIS — Z00.00 ROUTINE HISTORY AND PHYSICAL EXAMINATION OF ADULT: Primary | ICD-10-CM

## 2025-01-21 DIAGNOSIS — E78.2 MIXED HYPERLIPIDEMIA: ICD-10-CM

## 2025-01-21 DIAGNOSIS — R07.81 RIB PAIN ON RIGHT SIDE: ICD-10-CM

## 2025-01-21 LAB
ERYTHROCYTE [SEDIMENTATION RATE] IN BLOOD BY WESTERGREN METHOD: 29 MM/HR (ref 0–30)
HGB BLD-MCNC: 12.6 G/DL (ref 11.7–15.7)

## 2025-01-21 PROCEDURE — 85018 HEMOGLOBIN: CPT | Performed by: INTERNAL MEDICINE

## 2025-01-21 PROCEDURE — 82550 ASSAY OF CK (CPK): CPT | Performed by: INTERNAL MEDICINE

## 2025-01-21 PROCEDURE — 99214 OFFICE O/P EST MOD 30 MIN: CPT | Mod: 25 | Performed by: INTERNAL MEDICINE

## 2025-01-21 PROCEDURE — 36415 COLL VENOUS BLD VENIPUNCTURE: CPT | Performed by: INTERNAL MEDICINE

## 2025-01-21 PROCEDURE — 85652 RBC SED RATE AUTOMATED: CPT | Performed by: INTERNAL MEDICINE

## 2025-01-21 PROCEDURE — 82306 VITAMIN D 25 HYDROXY: CPT | Performed by: INTERNAL MEDICINE

## 2025-01-21 PROCEDURE — 71101 X-RAY EXAM UNILAT RIBS/CHEST: CPT | Mod: TC | Performed by: RADIOLOGY

## 2025-01-21 PROCEDURE — 99396 PREV VISIT EST AGE 40-64: CPT | Mod: 25 | Performed by: INTERNAL MEDICINE

## 2025-01-21 PROCEDURE — 80053 COMPREHEN METABOLIC PANEL: CPT | Performed by: INTERNAL MEDICINE

## 2025-01-21 PROCEDURE — 90715 TDAP VACCINE 7 YRS/> IM: CPT | Performed by: INTERNAL MEDICINE

## 2025-01-21 PROCEDURE — 80061 LIPID PANEL: CPT | Performed by: INTERNAL MEDICINE

## 2025-01-21 PROCEDURE — G2211 COMPLEX E/M VISIT ADD ON: HCPCS | Performed by: INTERNAL MEDICINE

## 2025-01-21 PROCEDURE — 86038 ANTINUCLEAR ANTIBODIES: CPT | Performed by: INTERNAL MEDICINE

## 2025-01-21 PROCEDURE — 90471 IMMUNIZATION ADMIN: CPT | Performed by: INTERNAL MEDICINE

## 2025-01-21 RX ORDER — ROSUVASTATIN CALCIUM 5 MG/1
5 TABLET, COATED ORAL DAILY
Qty: 90 TABLET | Refills: 1 | Status: SHIPPED | OUTPATIENT
Start: 2025-01-21

## 2025-01-21 NOTE — NURSING NOTE
/72   Pulse 81   Temp (!) 96.5  F (35.8  C) (Tympanic)   Resp 13   Ht 1.524 m (5')   Wt 64.8 kg (142 lb 12.8 oz)   LMP 07/31/2012   SpO2 97%   BMI 27.89 kg/m        Prior to immunization administration, verified patients identity using patient s name and date of birth. Please see Immunization Activity for additional information.     Screening Questionnaire for Adult Immunization    Are you sick today?   No   Do you have allergies to medications, food, a vaccine component or latex?   No   Have you ever had a serious reaction after receiving a vaccination?   No   Do you have a long-term health problem with heart, lung, kidney, or metabolic disease (e.g., diabetes), asthma, a blood disorder, no spleen, complement component deficiency, a cochlear implant, or a spinal fluid leak?  Are you on long-term aspirin therapy?   No   Do you have cancer, leukemia, HIV/AIDS, or any other immune system problem?   No   Do you have a parent, brother, or sister with an immune system problem?   No   In the past 3 months, have you taken medications that affect  your immune system, such as prednisone, other steroids, or anticancer drugs; drugs for the treatment of rheumatoid arthritis, Crohn s disease, or psoriasis; or have you had radiation treatments?   No   Have you had a seizure, or a brain or other nervous system problem?   No   During the past year, have you received a transfusion of blood or blood    products, or been given immune (gamma) globulin or antiviral drug?   No   For women: Are you pregnant or is there a chance you could become       pregnant during the next month?   No   Have you received any vaccinations in the past 4 weeks?   No     Immunization questionnaire answers were all negative.      Patient instructed to remain in clinic for 15 minutes afterwards, and to report any adverse reactions.     Screening performed by Yazmin Medel MA on 1/21/2025 at 1:36 PM.

## 2025-01-21 NOTE — PROGRESS NOTES
Preventive Care Visit  [facility]  Erica Green MD, Internal Medicine  Jan 21, 2025      Assessment & Plan     (Z00.00) Routine history and physical examination of adult  (primary encounter diagnosis)  Plan: Hemoglobin, Comprehensive metabolic panel            (R07.81) Rib pain on right side  Plan: XR Ribs & Chest Right G/E 3 Views.pt was told I will contact her after results and proceed accordingly.    Xray reviewed- no fractures, message sent to pt .          (E78.2) Mixed hyperlipidemia  Plan: on crestor 5 mg daily refilled.explained clearly about the medication,insructions and side effects. Check  Lipid panel reflex to direct LDL Fasting and CK            (M79.10) Myalgia  Plan: CK total, ESR: Erythrocyte sedimentation rate,         Vitamin D Deficiency, Anti Nuclear Citallli IgG by         IFA with Reflex.pt was told I will contact her after results and proceed accordingly.         Patient has been advised of split billing requirements and indicates understanding: Yes        BMI  Estimated body mass index is 27.89 kg/m  as calculated from the following:    Height as of this encounter: 1.524 m (5').    Weight as of this encounter: 64.8 kg (142 lb 12.8 oz).   Weight management plan: Discussed healthy diet and exercise guidelines    Counseling  Appropriate preventive services were addressed with this patient via screening, questionnaire, or discussion as appropriate .      Brittany Randolph is a 59 year old, presenting for the following:  Physical        1/21/2025    12:56 PM   Additional Questions   Roomed by alhaji   Accompanied by self         1/21/2025    12:56 PM   Patient Reported Additional Medications   Patient reports taking the following new medications none          HPI     Hyperlipidemia Follow-Up    Are you regularly taking any medication or supplement to lower your cholesterol?   Yes- crestor  Are you having muscle aches or other side effects that you think could be  caused by your cholesterol lowering medication?  Yes- muscle aches     Pt c/o muscles aches on and off since 1 yr,  mainly upper arms and also legs , no joint pains.      Pt also c/o rt sided rib pain since 6 days , pt states she tried to reach to the back seat of the car to grab something and felt pain rt rib area.      Health Care Directive  Patient does not have a Health Care Directive: Discussed advance care planning with patient; information given to patient to review.      1/20/2025   General Health   How would you rate your overall physical health? Good   Feel stress (tense, anxious, or unable to sleep) Only a little   (!) STRESS CONCERN      1/20/2025   Nutrition   Three or more servings of calcium each day? Yes   Diet: Regular (no restrictions)   How many servings of fruit and vegetables per day? (!) 2-3   How many sweetened beverages each day? 0-1         1/20/2025   Exercise   Days per week of moderate/strenous exercise 2 days   Average minutes spent exercising at this level 30 min   (!) EXERCISE CONCERN      1/20/2025   Social Factors   Frequency of gathering with friends or relatives Twice a week   Worry food won't last until get money to buy more No   Food not last or not have enough money for food? No   Do you have housing? (Housing is defined as stable permanent housing and does not include staying ouside in a car, in a tent, in an abandoned building, in an overnight shelter, or couch-surfing.) Yes   Are you worried about losing your housing? No   Lack of transportation? No   Unable to get utilities (heat,electricity)? No         1/21/2025   Fall Risk   Fallen 2 or more times in the past year? No   Trouble with walking or balance? No          1/20/2025   Dental   Dentist two times every year? Yes         12/12/2024   TB Screening   Were you born outside of the [place]? No         Today's PHQ-2 Score:       1/20/2025     6:28 PM   PHQ-2 ( 1999 [employer])   Q1: Little interest or pleasure in doing things 0    Q2: Feeling down, depressed or hopeless 0   PHQ-2 Score 0    Q1: Little interest or pleasure in doing things Not at all   Q2: Feeling down, depressed or hopeless Not at all   PHQ-2 Score 0       Patient-reported           1/20/2025   Substance Use   Alcohol more than 3/day or more than 7/wk Not Applicable   Do you use any other substances recreationally? No     Social History     Tobacco Use    Smoking status: Former     Current packs/day: 0.00     Average packs/day: 0.5 packs/day for 20.0 years (10.0 ttl pk-yrs)     Types: Cigarettes     Start date: 3/8/1989     Quit date: 3/8/2009     Years since quitting: 15.8    Smokeless tobacco: Never   Vaping Use    Vaping status: Never Used   Substance Use Topics    Alcohol use: No    Drug use: No           10/4/2024   LAST FHS-7 RESULTS   1st degree relative breast or ovarian cancer No        Mammogram 10/24           1/20/2025   One time HIV Screening   Previous HIV test? No         1/20/2025   STI Screening   New sexual partner(s) since last STI/HIV test? No     History of abnormal Pap smear: No - age 30- 64 PAP with HPV every 5 years recommended        Latest Ref Rng & Units 2/23/2022     8:28 AM 12/4/2007    12:00 AM   PAP / HPV   PAP  Negative for Intraepithelial Lesion or Malignancy (NILM)     PAP (Historical)   NIL    HPV 16 DNA Negative Negative     HPV 18 DNA Negative Negative     Other HR HPV Negative Negative       ASCVD Risk   The 10-year ASCVD risk score (Francisco WOOD, et al., 2019) is: 3.6%    Values used to calculate the score:      Age: 59 years      Sex: Female      Is Non- : No      Diabetic: No      Tobacco smoker: No      Systolic Blood Pressure: 122 mmHg      Is BP treated: No      HDL Cholesterol: 52 mg/dL      Total Cholesterol: 213 mg/dL           Reviewed and updated as needed this visit by Provider                    Past Medical History:   Diagnosis Date    Chronic rhinitis     Hypercholesteremia      Past Surgical  History:   Procedure Laterality Date    APPENDECTOMY      COLONOSCOPY N/A 6/21/2024    Procedure: Colonoscopy;  Surgeon: Natan Mckinney MD;  Location:  GI     Current Outpatient Medications   Medication Sig Dispense Refill    Famotidine (PEPCID PO) Take by mouth daily      rosuvastatin (CRESTOR) 5 MG tablet TAKE 1 TABLET BY MOUTH EVERY DAY 90 tablet 0    triamcinolone (ARISTOCORT HP) 0.5 % external cream Apply topically 2 times daily 45 g 0    hydrOXYzine HCl (ATARAX) 25 MG tablet TAKE 1 TABLET (25 MG) BY MOUTH NIGHTLY AS NEEDED FOR ITCHING. 90 tablet 1         Review of Systems  CONSTITUTIONAL: NEGATIVE for fever, chills, change in weight  EYES: NEGATIVE for vision changes or irritation  ENT/MOUTH: NEGATIVE for ear, mouth and throat problems  RESP: NEGATIVE for significant cough or SOB  BREAST: NEGATIVE for masses, tenderness or discharge  CV: NEGATIVE for chest pain, palpitations or peripheral edema  GI: NEGATIVE for nausea, abdominal pain, heartburn, or change in bowel habits  : NEGATIVE for frequency, dysuria, or hematuria  MUSCULOSKELETAL: myalgia    NEURO: NEGATIVE for weakness, dizziness or paresthesias  ENDOCRINE: NEGATIVE for temperature intolerance, skin/hair changes  HEME: NEGATIVE for bleeding problems  PSYCHIATRIC: NEGATIVE for changes in mood or affect     Objective    Exam  /72   Pulse 81   Temp (!) 96.5  F (35.8  C) (Tympanic)   Resp 13   Ht 1.524 m (5')   Wt 64.8 kg (142 lb 12.8 oz)   LMP 07/31/2012   SpO2 97%   BMI 27.89 kg/m     Estimated body mass index is 27.89 kg/m  as calculated from the following:    Height as of this encounter: 1.524 m (5').    Weight as of this encounter: 64.8 kg (142 lb 12.8 oz).    Physical Exam  GENERAL: alert and no distress  EYES: Eyes grossly normal to inspection, PERRL and conjunctivae and sclerae normal  HENT: ear canals and TM's normal, nose and mouth without ulcers or lesions  RESP: lungs clear to auscultation - no rales, rhonchi or  wheezes  BREAST: normal without masses, tenderness or nipple discharge and no palpable axillary masses or adenopathy  CV: regular rate and rhythm, normal S1 S2, no S3 or S4, no murmur, click or rub, no peripheral edema  ABDOMEN: soft, nontender, no hepatosplenomegaly, no masses and bowel sounds normal  MS: no gross musculoskeletal defects noted, no edema  NEURO: Normal strength and tone, mentation intact and speech normal  PSYCH: mentation appears normal, affect normal/bright        Signed Electronically by: Erica Green MD

## 2025-01-22 LAB
ALBUMIN SERPL BCG-MCNC: 4.4 G/DL (ref 3.5–5.2)
ALP SERPL-CCNC: 122 U/L (ref 40–150)
ALT SERPL W P-5'-P-CCNC: 23 U/L (ref 0–50)
ANA SER QL IF: NEGATIVE
ANION GAP SERPL CALCULATED.3IONS-SCNC: 10 MMOL/L (ref 7–15)
AST SERPL W P-5'-P-CCNC: 27 U/L (ref 0–45)
BILIRUB SERPL-MCNC: 0.3 MG/DL
BUN SERPL-MCNC: 10.1 MG/DL (ref 8–23)
CALCIUM SERPL-MCNC: 9.3 MG/DL (ref 8.8–10.4)
CHLORIDE SERPL-SCNC: 103 MMOL/L (ref 98–107)
CHOLEST SERPL-MCNC: 199 MG/DL
CK SERPL-CCNC: 71 U/L (ref 26–192)
CREAT SERPL-MCNC: 0.87 MG/DL (ref 0.51–0.95)
EGFRCR SERPLBLD CKD-EPI 2021: 75 ML/MIN/1.73M2
FASTING STATUS PATIENT QL REPORTED: YES
FASTING STATUS PATIENT QL REPORTED: YES
GLUCOSE SERPL-MCNC: 87 MG/DL (ref 70–99)
HCO3 SERPL-SCNC: 26 MMOL/L (ref 22–29)
HDLC SERPL-MCNC: 52 MG/DL
LDLC SERPL CALC-MCNC: 113 MG/DL
NONHDLC SERPL-MCNC: 147 MG/DL
POTASSIUM SERPL-SCNC: 4.7 MMOL/L (ref 3.4–5.3)
PROT SERPL-MCNC: 7.6 G/DL (ref 6.4–8.3)
SODIUM SERPL-SCNC: 139 MMOL/L (ref 135–145)
TRIGL SERPL-MCNC: 172 MG/DL
VIT D+METAB SERPL-MCNC: 16 NG/ML (ref 20–50)

## (undated) DEVICE — KIT ENDO TURNOVER/PROCEDURE W/CLEAN A SCOPE LINERS 103888

## (undated) RX ORDER — FENTANYL CITRATE 50 UG/ML
INJECTION, SOLUTION INTRAMUSCULAR; INTRAVENOUS
Status: DISPENSED
Start: 2024-06-21

## (undated) RX ORDER — BUPIVACAINE HYDROCHLORIDE 5 MG/ML
INJECTION, SOLUTION EPIDURAL; INTRACAUDAL
Status: DISPENSED
Start: 2023-06-27